# Patient Record
Sex: MALE | Race: WHITE | NOT HISPANIC OR LATINO | Employment: OTHER | URBAN - METROPOLITAN AREA
[De-identification: names, ages, dates, MRNs, and addresses within clinical notes are randomized per-mention and may not be internally consistent; named-entity substitution may affect disease eponyms.]

---

## 2018-01-01 ENCOUNTER — APPOINTMENT (OUTPATIENT)
Dept: GENERAL RADIOLOGY | Facility: CLINIC | Age: 78
End: 2018-01-01
Attending: EMERGENCY MEDICINE
Payer: MEDICARE

## 2018-01-01 ENCOUNTER — HOSPITAL ENCOUNTER (EMERGENCY)
Facility: CLINIC | Age: 78
Discharge: HOME | End: 2018-10-22
Attending: EMERGENCY MEDICINE | Admitting: EMERGENCY MEDICINE
Payer: MEDICARE

## 2018-01-01 ENCOUNTER — APPOINTMENT (OUTPATIENT)
Dept: CT IMAGING | Facility: CLINIC | Age: 78
End: 2018-01-01
Attending: EMERGENCY MEDICINE
Payer: MEDICARE

## 2018-01-01 VITALS
HEIGHT: 73 IN | OXYGEN SATURATION: 99 % | SYSTOLIC BLOOD PRESSURE: 173 MMHG | BODY MASS INDEX: 23.19 KG/M2 | HEART RATE: 56 BPM | WEIGHT: 175 LBS | RESPIRATION RATE: 16 BRPM | DIASTOLIC BLOOD PRESSURE: 79 MMHG

## 2018-01-01 DIAGNOSIS — S80.02XA CONTUSION OF LEFT KNEE, INITIAL ENCOUNTER: ICD-10-CM

## 2018-01-01 DIAGNOSIS — S70.00XA CONTUSION OF HIP, INITIAL ENCOUNTER: ICD-10-CM

## 2018-01-01 DIAGNOSIS — S00.03XA CONTUSION OF SCALP, INITIAL ENCOUNTER: ICD-10-CM

## 2018-01-01 LAB
ANION GAP SERPL CALCULATED.3IONS-SCNC: 5 MMOL/L (ref 3–14)
BASOPHILS # BLD AUTO: 0 10E9/L (ref 0–0.2)
BASOPHILS NFR BLD AUTO: 0.4 %
BUN SERPL-MCNC: 15 MG/DL (ref 7–30)
CALCIUM SERPL-MCNC: 8.5 MG/DL (ref 8.5–10.1)
CHLORIDE SERPL-SCNC: 103 MMOL/L (ref 94–109)
CO2 SERPL-SCNC: 31 MMOL/L (ref 20–32)
CREAT SERPL-MCNC: 1 MG/DL (ref 0.66–1.25)
DIFFERENTIAL METHOD BLD: ABNORMAL
EOSINOPHIL # BLD AUTO: 0.2 10E9/L (ref 0–0.7)
EOSINOPHIL NFR BLD AUTO: 4.7 %
ERYTHROCYTE [DISTWIDTH] IN BLOOD BY AUTOMATED COUNT: 15.3 % (ref 10–15)
GFR SERPL CREATININE-BSD FRML MDRD: 73 ML/MIN/1.7M2
GLUCOSE SERPL-MCNC: 193 MG/DL (ref 70–99)
HCT VFR BLD AUTO: 33.4 % (ref 40–53)
HGB BLD-MCNC: 10.8 G/DL (ref 13.3–17.7)
IMM GRANULOCYTES # BLD: 0 10E9/L (ref 0–0.4)
IMM GRANULOCYTES NFR BLD: 0.2 %
INTERPRETATION ECG - MUSE: NORMAL
LYMPHOCYTES # BLD AUTO: 1 10E9/L (ref 0.8–5.3)
LYMPHOCYTES NFR BLD AUTO: 20.9 %
MCH RBC QN AUTO: 29.5 PG (ref 26.5–33)
MCHC RBC AUTO-ENTMCNC: 32.3 G/DL (ref 31.5–36.5)
MCV RBC AUTO: 91 FL (ref 78–100)
MONOCYTES # BLD AUTO: 0.4 10E9/L (ref 0–1.3)
MONOCYTES NFR BLD AUTO: 8 %
NEUTROPHILS # BLD AUTO: 3.2 10E9/L (ref 1.6–8.3)
NEUTROPHILS NFR BLD AUTO: 65.8 %
NRBC # BLD AUTO: 0 10*3/UL
NRBC BLD AUTO-RTO: 0 /100
PLATELET # BLD AUTO: 125 10E9/L (ref 150–450)
POTASSIUM SERPL-SCNC: 4.2 MMOL/L (ref 3.4–5.3)
RBC # BLD AUTO: 3.66 10E12/L (ref 4.4–5.9)
SODIUM SERPL-SCNC: 139 MMOL/L (ref 133–144)
WBC # BLD AUTO: 4.9 10E9/L (ref 4–11)

## 2018-01-01 PROCEDURE — 70450 CT HEAD/BRAIN W/O DYE: CPT

## 2018-01-01 PROCEDURE — 85025 COMPLETE CBC W/AUTO DIFF WBC: CPT | Performed by: EMERGENCY MEDICINE

## 2018-01-01 PROCEDURE — 73562 X-RAY EXAM OF KNEE 3: CPT | Mod: LT

## 2018-01-01 PROCEDURE — 73522 X-RAY EXAM HIPS BI 3-4 VIEWS: CPT

## 2018-01-01 PROCEDURE — 99285 EMERGENCY DEPT VISIT HI MDM: CPT | Mod: 25

## 2018-01-01 PROCEDURE — 93005 ELECTROCARDIOGRAM TRACING: CPT

## 2018-01-01 PROCEDURE — 80048 BASIC METABOLIC PNL TOTAL CA: CPT | Performed by: EMERGENCY MEDICINE

## 2018-01-01 RX ORDER — CARBIDOPA AND LEVODOPA 25; 100 MG/1; MG/1
2 TABLET ORAL 3 TIMES DAILY
COMMUNITY
Start: 2015-07-06

## 2018-01-01 RX ORDER — DONEPEZIL HYDROCHLORIDE 10 MG/1
10 TABLET, FILM COATED ORAL DAILY
COMMUNITY

## 2018-01-01 RX ORDER — LIDOCAINE 40 MG/G
CREAM TOPICAL
Status: DISCONTINUED | OUTPATIENT
Start: 2018-01-01 | End: 2018-01-01 | Stop reason: HOSPADM

## 2018-01-01 RX ORDER — MODAFINIL 200 MG/1
200 TABLET ORAL EVERY MORNING
COMMUNITY
End: 2019-01-01

## 2018-01-01 ASSESSMENT — ENCOUNTER SYMPTOMS
HEADACHES: 1
LIGHT-HEADEDNESS: 1
ARTHRALGIAS: 0
DIZZINESS: 1
MYALGIAS: 0

## 2018-10-22 NOTE — ED AVS SNAPSHOT
[facility] Emergency Department    [address]    [facility] [address]    Phone:  [phone]    Fax:  [phone]                                       Josafat Matson   MRN: [id number]    Department:  [facility] Emergency Department   Date of Visit:  10/22/2018           After Visit Summary Signature Page     I have received my discharge instructions, and my questions have been answered. I have discussed any challenges I see with this plan with the nurse or doctor.    ..........................................................................................................................................  Patient/Patient Representative Signature      ..........................................................................................................................................  Patient Representative Print Name and Relationship to Patient    ..................................................               ................................................  Date                                   Time    ..........................................................................................................................................  Reviewed by Signature/Title    ...................................................              ..............................................  Date                                               Time          22EPIC Rev 08/18

## 2018-10-22 NOTE — ED PROVIDER NOTES
History     Chief Complaint:  Fall    HPI   Josafat Matson is a 78 year old male with a history of atrial fibrillation, diabetes, alzheimer's, among others, just recently off of Eliquis, who presents with his wife via EMS for evaluation after sustaining a fall earlier today. Per report, the patient had just finished a cycling routine at the [facility], when he stood suddenly felt dizzy causing him to fall to his knees. His wife states that at this time she tried to catch him, but he twisted out of her  and felt to his left side, striking his head against the wall. EMS was then called. While in the ED the patient denies any significant pain, although he does note some left knee and hip pain as he states that this knee was the first to hit the ground, as well as some persistent head pain due to a contusion. Of note, the patient just recently ended his long term use of anticoagulants due recurring anemia and dizziness of unknown etiology. He stopped taking this on the 12th of this month but continues to take a baby aspirin daily. The patient was on Eliquis for chronic atrial fibrillation, and his wife also reports a history of aortic aneurysms and stent placement. The patient otherwise did not sustain a loss of consciousness today and does not endorse any further symptoms at this week.     Allergies:  Tamsulosin  Terazosin    Medications:    Baby aspirin  Lipitor  Aricept  Metformin  Nitroglycerin  Namenda  Provigil  Mirapex  Sinemet    Past Medical History:    Atrial fibrillation  Alzheimer's disease  CAD  MI  Hypertension  Hyperlipidemia  Diabetes  Stented coronary artery  Sleep apnea    Past Surgical History:    Coronary stent placement  Colonoscopy  Cataract removal  Intraocular lens implant    Family History:    History reviewed. No pertinent family history.     Social History:  The patient was accompanied to the ED by his wife.  Smoking Status: Former  Smokeless Tobacco: Never  Alcohol Use: Yes  Marital  "Status:       PCP: Georgiana Bansal MD    Review of Systems   Musculoskeletal: Negative for arthralgias and myalgias.   Neurological: Positive for dizziness, light-headedness and headaches.   All other systems reviewed and are negative.    Physical Exam     Patient Vitals for the past 24 hrs:   BP Pulse Heart Rate Resp SpO2 Height Weight   10/22/18 1706 173/79 - - - 99 % - -   10/22/18 1600 139/69 - 56 11 100 % - -   10/22/18 1515 - - 59 13 100 % - -   10/22/18 1454 128/73 62 - 20 100 % 1.854 m (6' 1\") 79.4 kg (175 lb)     Physical Exam   GEN- alert, cooperative  HEENT- atraumatic, PERRL, EOMI, MMM, oral pharynx without abnormalities, no dental injuries, midface stable, TM's clear bilaterally  NECK- ROM, soft, supple, no midline C spine tenderness to palpation, no abrasions  RESP- CTAB, no w/r/r, chest wall nontender, no crepitus, symmetrical chest wall movement  CV- RRR, no m/r/g  ABD- soft, NT/ND, +BS  MSK- normal ROM in all extremities, pelvis stable to AP and lateral compression, no T and L spinal tenderness in the midline, 5/5 strength in all extremities. Mild B hip TTP without deformity or signs of trauma. L knee with mild TTP without signs of trauma  NEURO- GCS 15, speech normal, alert, 5/5 strength x 4, sensation to light touch intact in all extremities,  strong bilaterally  SKIN- no rash, no bruising, no ecchymosis, no abrasio  PSYCH- normal mood, normal behavior, normal thought process    Emergency Department Course     ECG (14:52:45):  Rate 61 bpm. MT interval 204. QRS duration 98. QT/QTc 416/418. P-R-T axes 58 61 56. Normal sinus rhythm. Interpreted by Mindy Monique MD.    Imaging:  Radiology findings were communicated with the patient who voiced understanding of the findings.  XR Pelvis w 1 View Each Hip:  Left knee: No acute or chronic bony abnormality. No joint space  effusion. Vascular calcifications are seen.    Pelvis and hips: No acute bony abnormality. Chondrocalcinosis " right  femoral head is noted. No significant degenerative changes of the  hips. Extensive vascular calcifications. Aortobiiliac stent.    As read by radiology    XR Knee Left 3 Views:  Left knee: No acute or chronic bony abnormality. No joint space  effusion. Vascular calcifications are seen.    Pelvis and hips: No acute bony abnormality. Chondrocalcinosis right  femoral head is noted. No significant degenerative changes of the  hips. Extensive vascular calcifications. Aortobiiliac stent.    As read by radiology    CT Head w/o Contrast:  1. No acute abnormality.  2.  Atrophy of the brain.  White matter changes consistent with  sequelae of small vessel ischemic disease.    ARNOLDO JOSEPH MD    Laboratory:  Laboratory findings were communicated with the patient who voiced understanding of the findings.    CBC: WBC 4.9, HGB 10.8 (L),  (L)  BMP: Glucose 193 (H), o/w WNL (Creatinine 1.00)    Emergency Department Course:  Nursing notes and vitals reviewed.    The patient was sent for multiple lower extremity x-rays while in the emergency department, results above.     IV was inserted and blood was drawn for laboratory testing, results above.    1459: I performed an exam of the patient as documented above.     1705: Patient rechecked and updated. Patient is feeling better and is ready to go home.    Findings and plan explained to the Patient. Patient discharged home with instructions regarding supportive care, medications, and reasons to return. The importance of close follow-up was reviewed.     Impression & Plan      Medical Decision Making:  This patient presents with fall from standing up too fast at the gym. Patient initially was oriented x 3 and normal head to toe exam showed contusion of the hip and left knee and also a small scalp contusion. There is no swelling or deformities noted, otheriwse labs, x-rays and CT were normal. He was recently taken off of Eliquis but placed on aspirin. Currently he feels  comfortable going home and has no complaints and his wife was comfortable taking him home. His dizziness has been worked up and is thought to be due to low blood pressure from standing and they are well aware that he does need to take his time to ensure that this does not happen in the future.     Diagnosis:    ICD-10-CM    1. Contusion of scalp, initial encounter S00.03XA    2. Hip contusion  3. Knee contusion  4. Fall    Disposition:  discharged to home    Jigna Clay  10/22/2018   [facility] EMERGENCY DEPARTMENT  IJigna, phyllis serving as a scribe at 2:59 PM on 10/22/2018 to document services personally performed by Mindy Monique MD based on my observations and the provider's statements to me.  D     Mindy Monique MD  10/24/18 2051

## 2018-10-22 NOTE — ED NOTES
"Pt with history of dizziness upon standing, stood up and felt dizzy. Pt started to fall. Wife tried to hold him up  \"but he went down to his knees and then tipped over on his right side and hit his head on the railing. Pt denies HA, neck or back pain. Denies knee pain or hip pain. Range of motion intact to extrem. Pt recently stopped eliquis so I thought we I should be checked.\"  "
Bed: ED20  Expected date:   Expected time:   Means of arrival:   Comments:  Ambulance  
awake/alert/oriented to person, place, time/situation

## 2018-10-22 NOTE — ED AVS SNAPSHOT
[facility] Emergency Department    [address] [place] [address]    Phone:  [phone]    Fax:  [phone]                                       Josafat Matson   MRN: [id number]    Department:  [facility] Emergency Department   Date of Visit:  10/22/2018           Patient Information     Date Of Birth          1940        Your diagnoses for this visit were:     Contusion of scalp, initial encounter     Contusion of hip, initial encounter     Contusion of left knee, initial encounter        You were seen by Mindy Monique MD.      Follow-up Information     Follow up with Georgiana Bansal.    Specialty:  Internal Medicine    Why:  As needed    Contact information:    [facility]  [address] [place] [address]  [phone]          Discharge Instructions         Bruises (Contusions)    A contusion is a bruise. A bruise happens when a blow to your body doesn't break the skin but does break blood vessels beneath the skin. Blood leaking from the broken vessels causes redness and swelling. As it heals, your bruise is likely to turn colors like purple, green, and yellow. This is normal. The bruise should fade in 2 or 3 weeks.  Factors that make you more likely to bruise  Almost everyone bruises now and then. Certain people do bruise more easily than others. You're more prone to bruising as you get older. That's because blood vessels become more fragile with age. You're also more likely to bruise if you have a clotting disorder such as hemophilia or take medicines that reduce clotting, including aspirin and coumadin.  When to go to the emergency room (ER)  Bruises almost always heal on their own without special treatment. But for some people, a bad bruise can be serious. Seek medical care if you:    Have a clotting disorder such as hemophilia    Have cirrhosis or other serious liver disease    Take blood-thinning medicines such as warfarin  What to expect  in the ER  A doctor will examine your bruise and ask about any health conditions you have. In some cases, you may have a test to check how well your blood clots. Other treatment will depend on your needs.  Follow-up care  Sometimes a bruise gets worse instead of better. It may become larger and more swollen. This can occur when your body walls off a small pool of blood under the skin (hematoma). In very rare cases, your doctor may need to drain extra blood from the area.  Tip:  Apply an ice pack or bag of frozen peas to a bruise. Keep a thin cloth between the ice or frozen peas and your skin. The cold can help reduce redness and swelling.   Date Last Reviewed: 12/1/2016 2000-2017 [employer]. [address] [place] [address], [place] [id number]. All rights reserved. This information is not intended as a substitute for professional medical care. Always follow your healthcare professional's instructions.          Hip Contusion    A contusion is another word for a bruise. It happens when small blood vessels break open and leak blood into the nearby area. A hip contusion can result from a bump, hit, or fall. Symptoms of a contusion often include changes in skin color (bruising), swelling, and pain. It may take several hours for a deep bruise to show up. If the injury is severe, you may need an X-ray to check for broken bones. Swelling should decrease in a few days. Bruising and pain may take several weeks to go away.  Home care    Unless another medicine was prescribed, you may take acetaminophen, ibuprofen, or naproxen to help relieve pain and swelling. If needed, stronger pain medicines may be prescribed. Take all medicines exactly as directed.    Ice the bruised area to help reduce pain and swelling. Wrap a cold source (ice pack or ice cubes in a plastic bag) in a thin towel. Apply the cold source to the bruised area for 20 minutes every 1 to 2 hours the first day. Continue this 3 to 4 times a day until the  pain and swelling goes away.    If walking causes pain, use crutches or a walker until you can walk without pain. These items can be rented at most pharmacies and orthopedic supply stores.    If your injury is keeping you from moving around or caring for yourself properly, you may qualify for services such as home healthcare. Check with your doctor and insurance company to see if this type of care is covered.  Follow-up  Follow up with your healthcare provider, or as advised.  When to seek medical advice   Call your healthcare provider right away if any of these occur:    Increased pain, bruising, or swelling near the injured area    Decreased ability to bear weight on the injured side    Pain or swelling develops below the knee    Chest pain or shortness of breath  Date Last Reviewed: 4/1/2017 2000-2017 [employer]. [address] [place] [address]. All rights reserved. This information is not intended as a substitute for professional medical care. Always follow your healthcare professional's instructions.          24 Hour Appointment Hotline       To make an appointment at any [facility], call [phone] ([phone]). If you don't have a family doctor or clinic, we will help you find one. [facility] clinics are conveniently located to serve the needs of you and your family.             Review of your medicines      Our records show that you are taking the medicines listed below. If these are incorrect, please call your family doctor or clinic.        Dose / Directions Last dose taken    carbidopa-levodopa  MG per tablet   Commonly known as:  SINEMET   Dose:  2 tablet        Take 2 tablets by mouth   Refills:  0        CYANOCOBALAMIN PO   Dose:  1000 mcg        Take 1,000 mcg by mouth daily   Refills:  0        donepezil 10 MG tablet   Commonly known as:  ARICEPT   Dose:  10 mg        Take 10 mg by mouth   Refills:  0        ECOTRIN PO   Dose:  325 mg        Take 325 mg by  mouth daily   Refills:  0        LIPITOR PO   Dose:  80 mg        Take 80 mg by mouth daily   Refills:  0        metFORMIN 500 MG/5ML Soln solution   Commonly known as:  GLUCOPHAGE   Dose:  500 mg        Take 500 mg by mouth 2 times daily   Refills:  0        * MIRAPEX PO   Dose:  0.5-0.75 mg        Take 0.5-0.75 mg by mouth daily 2-3 hours before bedtime   Refills:  0        * MIRAPEX PO   Dose:  0.5 mg        Take 0.5 mg by mouth daily At bedtime   Refills:  0        modafinil 200 MG tablet   Commonly known as:  PROVIGIL   Dose:  100 mg        Take 100 mg by mouth   Refills:  0        Multi-vitamin Tabs tablet   Dose:  1 tablet        Take 1 tablet by mouth daily   Refills:  0        NAMENDA PO   Dose:  10 mg        Take 10 mg by mouth daily   Refills:  0        NITROGLYCERIN SL   Dose:  0.4 mg        Place 0.4 mg under the tongue every 5 minutes as needed   Refills:  0        VITAMIN B6 PO   Dose:  100 mg        Take 100 mg by mouth daily   Refills:  0        * Notice:  This list has 2 medication(s) that are the same as other medications prescribed for you. Read the directions carefully, and ask your doctor or other care provider to review them with you.            Procedures and tests performed during your visit     Basic metabolic panel    CBC with platelets differential    CT Head w/o Contrast    EKG 12 lead    Peripheral IV catheter    XR Knee Left 3 Views    XR Pelvis w 1 View Each Hip      Orders Needing Specimen Collection     None      Pending Results     Date and Time Order Name Status Description    10/22/2018 1506 XR Pelvis w 1 View Each Hip Preliminary     10/22/2018 1506 XR Knee Left 3 Views Preliminary     10/22/2018 1456 EKG 12 lead Preliminary             Pending Culture Results     No orders found from 10/20/2018 to 10/23/2018.            Pending Results Instructions     If you had any lab results that were not finalized at the time of your Discharge, you can call the ED Lab Result RN at  [phone]. You will be contacted by this team for any positive Lab results or changes in treatment. The nurses are available 7 days a week from 10A to 6:30P.  You can leave a message 24 hours per day and they will return your call.        Test Results From Your Hospital Stay        10/22/2018  3:58 PM      Component Results     Component Value Ref Range & Units Status    WBC 4.9 4.0 - 11.0 10e9/L Final    RBC Count 3.66 (L) 4.4 - 5.9 10e12/L Final    Hemoglobin 10.8 (L) 13.3 - 17.7 g/dL Final    Hematocrit 33.4 (L) 40.0 - 53.0 % Final    MCV 91 78 - 100 fl Final    MCH 29.5 26.5 - 33.0 pg Final    MCHC 32.3 31.5 - 36.5 g/dL Final    RDW 15.3 (H) 10.0 - 15.0 % Final    Platelet Count 125 (L) 150 - 450 10e9/L Final    Diff Method Automated Method  Final    % Neutrophils 65.8 % Final    % Lymphocytes 20.9 % Final    % Monocytes 8.0 % Final    % Eosinophils 4.7 % Final    % Basophils 0.4 % Final    % Immature Granulocytes 0.2 % Final    Nucleated RBCs 0 0 /100 Final    Absolute Neutrophil 3.2 1.6 - 8.3 10e9/L Final    Absolute Lymphocytes 1.0 0.8 - 5.3 10e9/L Final    Absolute Monocytes 0.4 0.0 - 1.3 10e9/L Final    Absolute Eosinophils 0.2 0.0 - 0.7 10e9/L Final    Absolute Basophils 0.0 0.0 - 0.2 10e9/L Final    Abs Immature Granulocytes 0.0 0 - 0.4 10e9/L Final    Absolute Nucleated RBC 0.0  Final         10/22/2018  4:16 PM      Component Results     Component Value Ref Range & Units Status    Sodium 139 133 - 144 mmol/L Final    Potassium 4.2 3.4 - 5.3 mmol/L Final    Chloride 103 94 - 109 mmol/L Final    Carbon Dioxide 31 20 - 32 mmol/L Final    Anion Gap 5 3 - 14 mmol/L Final    Glucose 193 (H) 70 - 99 mg/dL Final    Urea Nitrogen 15 7 - 30 mg/dL Final    Creatinine 1.00 0.66 - 1.25 mg/dL Final    GFR Estimate 73 >60 mL/min/1.7m2 Final    Non  GFR Calc    GFR Estimate If Black 88 >60 mL/min/1.7m2 Final    African American GFR Calc    Calcium 8.5 8.5 - 10.1 mg/dL Final         10/22/2018  4:38  PM      Narrative     CT SCAN OF THE HEAD WITHOUT CONTRAST   10/22/2018 4:30 PM     HISTORY: Fall.    TECHNIQUE:  Axial images of the head and coronal reformations without  IV contrast material. Radiation dose for this scan was reduced using  automated exposure control, adjustment of the mA and/or kV according  to patient size, or iterative reconstruction technique.    COMPARISON: None.    FINDINGS:  There is generalized atrophy of the brain.  There is low  attenuation in the white matter of the cerebral hemispheres consistent  with sequelae of small vessel ischemic disease. There is no evidence  of intracranial hemorrhage, mass, acute infarct or anomaly.     The visualized portions of the sinuses and mastoids appear normal.  There is no evidence of trauma.        Impression     IMPRESSION:   1. No acute abnormality.  2.  Atrophy of the brain.  White matter changes consistent with  sequelae of small vessel ischemic disease.      ARNOLDO JOSEPH MD         10/22/2018  4:55 PM      Narrative     XR LEFT KNEE THREE VIEWS, XR PELVIS AND BILATERAL HIP ONE VIEW  10/22/2018 4:48 PM     HISTORY: Fall.    COMPARISON: None.        Impression     IMPRESSION:     Left knee: No acute or chronic bony abnormality. No joint space  effusion. Vascular calcifications are seen.    Pelvis and hips: No acute bony abnormality. Chondrocalcinosis right  femoral head is noted. No significant degenerative changes of the  hips. Extensive vascular calcifications. Aortobiiliac stent.         10/22/2018  4:55 PM      Narrative     XR LEFT KNEE THREE VIEWS, XR PELVIS AND BILATERAL HIP ONE VIEW  10/22/2018 4:48 PM     HISTORY: Fall.    COMPARISON: None.        Impression     IMPRESSION:     Left knee: No acute or chronic bony abnormality. No joint space  effusion. Vascular calcifications are seen.    Pelvis and hips: No acute bony abnormality. Chondrocalcinosis right  femoral head is noted. No significant degenerative changes of the  hips. Extensive  vascular calcifications. Aortobiiliac stent.                Clinical Quality Measure: Blood Pressure Screening     Your blood pressure was checked while you were in the emergency department today. The last reading we obtained was  BP: 139/69 . Please read the guidelines below about what these numbers mean and what you should do about them.  If your systolic blood pressure (the top number) is less than 120 and your diastolic blood pressure (the bottom number) is less than 80, then your blood pressure is normal. There is nothing more that you need to do about it.  If your systolic blood pressure (the top number) is 120-139 or your diastolic blood pressure (the bottom number) is 80-89, your blood pressure may be higher than it should be. You should have your blood pressure rechecked within a year by a primary care provider.  If your systolic blood pressure (the top number) is 140 or greater or your diastolic blood pressure (the bottom number) is 90 or greater, you may have high blood pressure. High blood pressure is treatable, but if left untreated over time it can put you at risk for heart attack, stroke, or kidney failure. You should have your blood pressure rechecked by a primary care provider within the next 4 weeks.  If your provider in the emergency department today gave you specific instructions to follow-up with your doctor or provider even sooner than that, you should follow that instruction and not wait for up to 4 weeks for your follow-up visit.        Thank you for choosing [facility]       Thank you for choosing [facility] for your care. Our goal is always to provide you with excellent care. Hearing back from our patients is one way we can continue to improve our services. Please take a few minutes to complete the written survey that you may receive in the mail after you visit with us. Thank you!        [facility] Information     [facility] lets you send messages to your doctor, view your test results, renew your  "prescriptions, schedule appointments and more. To sign up, go to [email or web address] . Click on \"Log in\" on the left side of the screen, which will take you to the Welcome page. Then click on \"Sign up Now\" on the right side of the page.     You will be asked to enter the access code listed below, as well as some personal information. Please follow the directions to create your username and password.     Your access code is: [email or web address]  Expires: 2019  5:14 PM     Your access code will  in 90 days. If you need help or a new code, please call your [facility] clinic or [phone].        Care EveryWhere ID     This is your Care EveryWhere ID. This could be used by other organizations to access your [facility] medical records  [id number]        Equal Access to Services     JAEL HARRIS : Chauncey Llanes, rafael quinones, mali junior, brennan torres . So [facility] [phone].    ATENCIÓN: Si habla español, tiene a norton disposición servicios gratuitos de asistencia lingüística. Llame al [phone].    We comply with applicable federal civil rights laws and [place] laws. We do not discriminate on the basis of race, color, national origin, age, disability, sex, sexual orientation, or gender identity.            After Visit Summary       This is your record. Keep this with you and show to your community pharmacist(s) and doctor(s) at your next visit.                  "

## 2018-10-22 NOTE — DISCHARGE INSTRUCTIONS
Bruises (Contusions)    A contusion is a bruise. A bruise happens when a blow to your body doesn't break the skin but does break blood vessels beneath the skin. Blood leaking from the broken vessels causes redness and swelling. As it heals, your bruise is likely to turn colors like purple, green, and yellow. This is normal. The bruise should fade in 2 or 3 weeks.  Factors that make you more likely to bruise  Almost everyone bruises now and then. Certain people do bruise more easily than others. You're more prone to bruising as you get older. That's because blood vessels become more fragile with age. You're also more likely to bruise if you have a clotting disorder such as hemophilia or take medicines that reduce clotting, including aspirin and coumadin.  When to go to the emergency room (ER)  Bruises almost always heal on their own without special treatment. But for some people, a bad bruise can be serious. Seek medical care if you:    Have a clotting disorder such as hemophilia    Have cirrhosis or other serious liver disease    Take blood-thinning medicines such as warfarin  What to expect in the ER  A doctor will examine your bruise and ask about any health conditions you have. In some cases, you may have a test to check how well your blood clots. Other treatment will depend on your needs.  Follow-up care  Sometimes a bruise gets worse instead of better. It may become larger and more swollen. This can occur when your body walls off a small pool of blood under the skin (hematoma). In very rare cases, your doctor may need to drain extra blood from the area.  Tip:  Apply an ice pack or bag of frozen peas to a bruise. Keep a thin cloth between the ice or frozen peas and your skin. The cold can help reduce redness and swelling.   Date Last Reviewed: 12/1/2016 2000-2017 [employer] [address] [facility], [place] [address]. All rights reserved. This information is not intended as a substitute for  professional medical care. Always follow your healthcare professional's instructions.          Hip Contusion    A contusion is another word for a bruise. It happens when small blood vessels break open and leak blood into the nearby area. A hip contusion can result from a bump, hit, or fall. Symptoms of a contusion often include changes in skin color (bruising), swelling, and pain. It may take several hours for a deep bruise to show up. If the injury is severe, you may need an X-ray to check for broken bones. Swelling should decrease in a few days. Bruising and pain may take several weeks to go away.  Home care    Unless another medicine was prescribed, you may take acetaminophen, ibuprofen, or naproxen to help relieve pain and swelling. If needed, stronger pain medicines may be prescribed. Take all medicines exactly as directed.    Ice the bruised area to help reduce pain and swelling. Wrap a cold source (ice pack or ice cubes in a plastic bag) in a thin towel. Apply the cold source to the bruised area for 20 minutes every 1 to 2 hours the first day. Continue this 3 to 4 times a day until the pain and swelling goes away.    If walking causes pain, use crutches or a walker until you can walk without pain. These items can be rented at most pharmacies and orthopedic supply stores.    If your injury is keeping you from moving around or caring for yourself properly, you may qualify for services such as home healthcare. Check with your doctor and insurance company to see if this type of care is covered.  Follow-up  Follow up with your healthcare provider, or as advised.  When to seek medical advice   Call your healthcare provider right away if any of these occur:    Increased pain, bruising, or swelling near the injured area    Decreased ability to bear weight on the injured side    Pain or swelling develops below the knee    Chest pain or shortness of breath  Date Last Reviewed: 4/1/2017 2000-2017 [facility]  [employer] [address] [place] [address], [place] [address]. All rights reserved. This information is not intended as a substitute for professional medical care. Always follow your healthcare professional's instructions.

## 2019-01-01 ENCOUNTER — HOME CARE/HOSPICE - HEALTHEAST (OUTPATIENT)
Dept: HOSPICE | Facility: HOSPICE | Age: 79
End: 2019-01-01

## 2019-01-01 ENCOUNTER — DOCUMENTATION ONLY (OUTPATIENT)
Dept: OTHER | Facility: CLINIC | Age: 79
End: 2019-01-01

## 2019-01-01 ENCOUNTER — AMBULATORY - HEALTHEAST (OUTPATIENT)
Dept: OTHER | Facility: CLINIC | Age: 79
End: 2019-01-01

## 2019-01-01 ENCOUNTER — AMBULATORY - HEALTHEAST (OUTPATIENT)
Dept: HOSPICE | Facility: HOSPICE | Age: 79
End: 2019-01-01

## 2019-01-01 ENCOUNTER — APPOINTMENT (OUTPATIENT)
Dept: CT IMAGING | Facility: CLINIC | Age: 79
DRG: 603 | End: 2019-01-01
Attending: EMERGENCY MEDICINE
Payer: MEDICARE

## 2019-01-01 ENCOUNTER — APPOINTMENT (OUTPATIENT)
Dept: CT IMAGING | Facility: CLINIC | Age: 79
End: 2019-01-01
Payer: MEDICARE

## 2019-01-01 ENCOUNTER — RECORDS - HEALTHEAST (OUTPATIENT)
Dept: LAB | Facility: CLINIC | Age: 79
End: 2019-01-01

## 2019-01-01 ENCOUNTER — RECORDS - HEALTHEAST (OUTPATIENT)
Dept: ADMINISTRATIVE | Facility: OTHER | Age: 79
End: 2019-01-01

## 2019-01-01 ENCOUNTER — APPOINTMENT (OUTPATIENT)
Dept: GENERAL RADIOLOGY | Facility: CLINIC | Age: 79
DRG: 603 | End: 2019-01-01
Attending: EMERGENCY MEDICINE
Payer: MEDICARE

## 2019-01-01 ENCOUNTER — APPOINTMENT (OUTPATIENT)
Dept: GENERAL RADIOLOGY | Facility: CLINIC | Age: 79
End: 2019-01-01
Attending: EMERGENCY MEDICINE
Payer: MEDICARE

## 2019-01-01 ENCOUNTER — APPOINTMENT (OUTPATIENT)
Dept: SPEECH THERAPY | Facility: CLINIC | Age: 79
DRG: 603 | End: 2019-01-01
Attending: INTERNAL MEDICINE
Payer: MEDICARE

## 2019-01-01 ENCOUNTER — APPOINTMENT (OUTPATIENT)
Dept: ULTRASOUND IMAGING | Facility: CLINIC | Age: 79
DRG: 603 | End: 2019-01-01
Attending: INTERNAL MEDICINE
Payer: MEDICARE

## 2019-01-01 ENCOUNTER — APPOINTMENT (OUTPATIENT)
Dept: MRI IMAGING | Facility: CLINIC | Age: 79
End: 2019-01-01
Payer: MEDICARE

## 2019-01-01 ENCOUNTER — APPOINTMENT (OUTPATIENT)
Dept: SPEECH THERAPY | Facility: CLINIC | Age: 79
DRG: 603 | End: 2019-01-01
Payer: MEDICARE

## 2019-01-01 ENCOUNTER — APPOINTMENT (OUTPATIENT)
Dept: ULTRASOUND IMAGING | Facility: CLINIC | Age: 79
End: 2019-01-01
Attending: EMERGENCY MEDICINE
Payer: MEDICARE

## 2019-01-01 ENCOUNTER — APPOINTMENT (OUTPATIENT)
Dept: PHYSICAL THERAPY | Facility: CLINIC | Age: 79
End: 2019-01-01
Payer: MEDICARE

## 2019-01-01 ENCOUNTER — HOSPITAL ENCOUNTER (OUTPATIENT)
Facility: CLINIC | Age: 79
Setting detail: OBSERVATION
Discharge: HOME | End: 2019-01-31
Attending: EMERGENCY MEDICINE | Admitting: HOSPITALIST
Payer: MEDICARE

## 2019-01-01 ENCOUNTER — HOSPITAL ENCOUNTER (EMERGENCY)
Facility: CLINIC | Age: 79
Discharge: HOME | End: 2019-02-11
Attending: EMERGENCY MEDICINE | Admitting: EMERGENCY MEDICINE
Payer: MEDICARE

## 2019-01-01 ENCOUNTER — HOSPITAL ENCOUNTER (INPATIENT)
Facility: CLINIC | Age: 79
LOS: 4 days | Discharge: NURSING, REHAB OR LONG-TERM CARE | DRG: 603 | End: 2019-03-14
Attending: EMERGENCY MEDICINE | Admitting: INTERNAL MEDICINE
Payer: MEDICARE

## 2019-01-01 VITALS
SYSTOLIC BLOOD PRESSURE: 128 MMHG | DIASTOLIC BLOOD PRESSURE: 75 MMHG | RESPIRATION RATE: 14 BRPM | OXYGEN SATURATION: 98 % | TEMPERATURE: 97.8 F | HEART RATE: 81 BPM

## 2019-01-01 VITALS
DIASTOLIC BLOOD PRESSURE: 62 MMHG | SYSTOLIC BLOOD PRESSURE: 138 MMHG | OXYGEN SATURATION: 97 % | HEIGHT: 73 IN | WEIGHT: 175 LBS | BODY MASS INDEX: 23.19 KG/M2 | RESPIRATION RATE: 16 BRPM | HEART RATE: 67 BPM | TEMPERATURE: 95.4 F

## 2019-01-01 VITALS
RESPIRATION RATE: 18 BRPM | HEIGHT: 73 IN | BODY MASS INDEX: 22.33 KG/M2 | HEART RATE: 108 BPM | TEMPERATURE: 96.2 F | DIASTOLIC BLOOD PRESSURE: 64 MMHG | OXYGEN SATURATION: 98 % | SYSTOLIC BLOOD PRESSURE: 127 MMHG | WEIGHT: 168.5 LBS

## 2019-01-01 DIAGNOSIS — L03.119 CELLULITIS OF HAND: ICD-10-CM

## 2019-01-01 DIAGNOSIS — M62.81 GENERALIZED MUSCLE WEAKNESS: ICD-10-CM

## 2019-01-01 DIAGNOSIS — K59.00 CONSTIPATION, UNSPECIFIED CONSTIPATION TYPE: ICD-10-CM

## 2019-01-01 DIAGNOSIS — L03.113 CELLULITIS OF RIGHT HAND: ICD-10-CM

## 2019-01-01 DIAGNOSIS — R41.82 ALTERED MENTAL STATUS, UNSPECIFIED ALTERED MENTAL STATUS TYPE: ICD-10-CM

## 2019-01-01 DIAGNOSIS — G20.A1 DEMENTIA DUE TO PARKINSON'S DISEASE WITH BEHAVIORAL DISTURBANCE (H): ICD-10-CM

## 2019-01-01 DIAGNOSIS — G20.A1 DEMENTIA DUE TO PARKINSON'S DISEASE WITHOUT BEHAVIORAL DISTURBANCE (H): ICD-10-CM

## 2019-01-01 DIAGNOSIS — K29.71 GASTRITIS WITH HEMORRHAGE, UNSPECIFIED CHRONICITY, UNSPECIFIED GASTRITIS TYPE: ICD-10-CM

## 2019-01-01 DIAGNOSIS — D64.9 ANEMIA, UNSPECIFIED TYPE: ICD-10-CM

## 2019-01-01 DIAGNOSIS — F02.80 DEMENTIA DUE TO PARKINSON'S DISEASE WITHOUT BEHAVIORAL DISTURBANCE (H): ICD-10-CM

## 2019-01-01 DIAGNOSIS — R45.1 AGITATION: Primary | ICD-10-CM

## 2019-01-01 DIAGNOSIS — F02.818 DEMENTIA DUE TO PARKINSON'S DISEASE WITH BEHAVIORAL DISTURBANCE (H): ICD-10-CM

## 2019-01-01 LAB
ABO + RH BLD: NORMAL
ABO + RH BLD: NORMAL
ALBUMIN SERPL-MCNC: 2.4 G/DL (ref 3.4–5)
ALBUMIN SERPL-MCNC: 2.8 G/DL (ref 3.5–5)
ALBUMIN UR-MCNC: NEGATIVE MG/DL
ALBUMIN UR-MCNC: NEGATIVE MG/DL
ALP SERPL-CCNC: 87 U/L (ref 40–150)
ALP SERPL-CCNC: 89 U/L (ref 45–120)
ALT SERPL W P-5'-P-CCNC: 15 U/L (ref 0–45)
ALT SERPL W P-5'-P-CCNC: 9 U/L (ref 0–70)
ANION GAP SERPL CALCULATED.3IONS-SCNC: 3 MMOL/L (ref 3–14)
ANION GAP SERPL CALCULATED.3IONS-SCNC: 3 MMOL/L (ref 3–14)
ANION GAP SERPL CALCULATED.3IONS-SCNC: 4 MMOL/L (ref 3–14)
ANION GAP SERPL CALCULATED.3IONS-SCNC: 5 MMOL/L (ref 3–14)
ANION GAP SERPL CALCULATED.3IONS-SCNC: 6 MMOL/L (ref 3–14)
ANION GAP SERPL CALCULATED.3IONS-SCNC: 6 MMOL/L (ref 5–18)
ANION GAP SERPL CALCULATED.3IONS-SCNC: 7 MMOL/L (ref 3–14)
ANION GAP SERPL CALCULATED.3IONS-SCNC: 8 MMOL/L (ref 3–14)
APPEARANCE UR: CLEAR
APPEARANCE UR: CLEAR
AST SERPL W P-5'-P-CCNC: 34 U/L (ref 0–45)
AST SERPL W P-5'-P-CCNC: 47 U/L (ref 0–40)
BACTERIA SPEC CULT: NO GROWTH
BACTERIA SPEC CULT: NO GROWTH
BASOPHILS # BLD AUTO: 0 10E9/L (ref 0–0.2)
BASOPHILS NFR BLD AUTO: 0.2 %
BASOPHILS NFR BLD AUTO: 0.3 %
BASOPHILS NFR BLD AUTO: 0.4 %
BASOPHILS NFR BLD AUTO: 0.5 %
BASOPHILS NFR BLD AUTO: 0.6 %
BILIRUB DIRECT SERPL-MCNC: 0.3 MG/DL
BILIRUB SERPL-MCNC: 0.9 MG/DL (ref 0–1)
BILIRUB SERPL-MCNC: 1.4 MG/DL (ref 0.2–1.3)
BILIRUB UR QL STRIP: NEGATIVE
BILIRUB UR QL STRIP: NEGATIVE
BLD GP AB SCN SERPL QL: NORMAL
BLOOD BANK CMNT PATIENT-IMP: NORMAL
BUN SERPL-MCNC: 12 MG/DL (ref 7–30)
BUN SERPL-MCNC: 13 MG/DL (ref 8–28)
BUN SERPL-MCNC: 14 MG/DL (ref 7–30)
BUN SERPL-MCNC: 14 MG/DL (ref 7–30)
BUN SERPL-MCNC: 16 MG/DL (ref 7–30)
BUN SERPL-MCNC: 17 MG/DL (ref 7–30)
BUN SERPL-MCNC: 21 MG/DL (ref 7–30)
BUN SERPL-MCNC: 9 MG/DL (ref 7–30)
CALCIUM SERPL-MCNC: 7.7 MG/DL (ref 8.5–10.1)
CALCIUM SERPL-MCNC: 7.8 MG/DL (ref 8.5–10.1)
CALCIUM SERPL-MCNC: 7.8 MG/DL (ref 8.5–10.1)
CALCIUM SERPL-MCNC: 8 MG/DL (ref 8.5–10.1)
CALCIUM SERPL-MCNC: 8 MG/DL (ref 8.5–10.1)
CALCIUM SERPL-MCNC: 8.8 MG/DL (ref 8.5–10.1)
CALCIUM SERPL-MCNC: 9.1 MG/DL (ref 8.5–10.5)
CALCIUM SERPL-MCNC: 9.2 MG/DL (ref 8.5–10.1)
CHLORIDE BLD-SCNC: 102 MMOL/L (ref 98–107)
CHLORIDE SERPL-SCNC: 101 MMOL/L (ref 94–109)
CHLORIDE SERPL-SCNC: 104 MMOL/L (ref 94–109)
CHLORIDE SERPL-SCNC: 107 MMOL/L (ref 94–109)
CHLORIDE SERPL-SCNC: 109 MMOL/L (ref 94–109)
CHLORIDE SERPL-SCNC: 111 MMOL/L (ref 94–109)
CHLORIDE SERPL-SCNC: 111 MMOL/L (ref 94–109)
CHLORIDE SERPL-SCNC: 112 MMOL/L (ref 94–109)
CO2 BLDCOV-SCNC: 28 MMOL/L (ref 21–28)
CO2 SERPL-SCNC: 26 MMOL/L (ref 20–32)
CO2 SERPL-SCNC: 27 MMOL/L (ref 20–32)
CO2 SERPL-SCNC: 27 MMOL/L (ref 20–32)
CO2 SERPL-SCNC: 28 MMOL/L (ref 20–32)
CO2 SERPL-SCNC: 28 MMOL/L (ref 20–32)
CO2 SERPL-SCNC: 30 MMOL/L (ref 20–32)
CO2 SERPL-SCNC: 30 MMOL/L (ref 22–31)
CO2 SERPL-SCNC: 31 MMOL/L (ref 20–32)
COLOR UR AUTO: YELLOW
COLOR UR AUTO: YELLOW
CREAT SERPL-MCNC: 0.82 MG/DL (ref 0.66–1.25)
CREAT SERPL-MCNC: 0.84 MG/DL (ref 0.7–1.3)
CREAT SERPL-MCNC: 0.91 MG/DL (ref 0.66–1.25)
CREAT SERPL-MCNC: 0.92 MG/DL (ref 0.66–1.25)
CREAT SERPL-MCNC: 0.96 MG/DL (ref 0.66–1.25)
CREAT SERPL-MCNC: 0.97 MG/DL (ref 0.66–1.25)
CREAT SERPL-MCNC: 1.02 MG/DL (ref 0.66–1.25)
CRP SERPL-MCNC: 36.5 MG/L (ref 0–8)
DIFFERENTIAL METHOD BLD: ABNORMAL
DIFFERENTIAL METHOD BLD: NORMAL
EOSINOPHIL # BLD AUTO: 0 10E9/L (ref 0–0.7)
EOSINOPHIL # BLD AUTO: 0.1 10E9/L (ref 0–0.7)
EOSINOPHIL NFR BLD AUTO: 0.6 %
EOSINOPHIL NFR BLD AUTO: 1.3 %
EOSINOPHIL NFR BLD AUTO: 1.4 %
EOSINOPHIL NFR BLD AUTO: 2.3 %
EOSINOPHIL NFR BLD AUTO: 2.4 %
ERYTHROCYTE [DISTWIDTH] IN BLOOD BY AUTOMATED COUNT: 13.2 % (ref 10–15)
ERYTHROCYTE [DISTWIDTH] IN BLOOD BY AUTOMATED COUNT: 13.2 % (ref 10–15)
ERYTHROCYTE [DISTWIDTH] IN BLOOD BY AUTOMATED COUNT: 13.3 % (ref 10–15)
ERYTHROCYTE [DISTWIDTH] IN BLOOD BY AUTOMATED COUNT: 13.4 % (ref 10–15)
ERYTHROCYTE [DISTWIDTH] IN BLOOD BY AUTOMATED COUNT: 13.5 % (ref 10–15)
ERYTHROCYTE [DISTWIDTH] IN BLOOD BY AUTOMATED COUNT: 13.5 % (ref 10–15)
ERYTHROCYTE [DISTWIDTH] IN BLOOD BY AUTOMATED COUNT: 13.8 % (ref 11–14.5)
ERYTHROCYTE [SEDIMENTATION RATE] IN BLOOD BY WESTERGREN METHOD: 33 MM/H (ref 0–20)
FERRITIN SERPL-MCNC: 160 NG/ML (ref 27–300)
FLUAV+FLUBV AG SPEC QL: NEGATIVE
FLUAV+FLUBV AG SPEC QL: NEGATIVE
FOLATE SERPL-MCNC: 5.7 NG/ML
GFR SERPL CREATININE-BSD FRML MDRD: 70 ML/MIN/{1.73_M2}
GFR SERPL CREATININE-BSD FRML MDRD: 75 ML/MIN/{1.73_M2}
GFR SERPL CREATININE-BSD FRML MDRD: 75 ML/MIN/{1.73_M2}
GFR SERPL CREATININE-BSD FRML MDRD: 76 ML/MIN/{1.73_M2}
GFR SERPL CREATININE-BSD FRML MDRD: 76 ML/MIN/{1.73_M2}
GFR SERPL CREATININE-BSD FRML MDRD: 80 ML/MIN/{1.73_M2}
GFR SERPL CREATININE-BSD FRML MDRD: 80 ML/MIN/{1.73_M2}
GFR SERPL CREATININE-BSD FRML MDRD: 85 ML/MIN/{1.73_M2}
GFR SERPL CREATININE-BSD FRML MDRD: >60 ML/MIN/1.73M2
GLUCOSE BLD-MCNC: 170 MG/DL (ref 70–125)
GLUCOSE BLDC GLUCOMTR-MCNC: 112 MG/DL (ref 70–99)
GLUCOSE BLDC GLUCOMTR-MCNC: 128 MG/DL (ref 70–99)
GLUCOSE BLDC GLUCOMTR-MCNC: 139 MG/DL (ref 70–99)
GLUCOSE BLDC GLUCOMTR-MCNC: 144 MG/DL (ref 70–99)
GLUCOSE BLDC GLUCOMTR-MCNC: 149 MG/DL (ref 70–99)
GLUCOSE BLDC GLUCOMTR-MCNC: 150 MG/DL (ref 70–99)
GLUCOSE BLDC GLUCOMTR-MCNC: 151 MG/DL (ref 70–99)
GLUCOSE BLDC GLUCOMTR-MCNC: 152 MG/DL (ref 70–99)
GLUCOSE BLDC GLUCOMTR-MCNC: 153 MG/DL (ref 70–99)
GLUCOSE BLDC GLUCOMTR-MCNC: 154 MG/DL (ref 70–99)
GLUCOSE BLDC GLUCOMTR-MCNC: 154 MG/DL (ref 70–99)
GLUCOSE BLDC GLUCOMTR-MCNC: 156 MG/DL (ref 70–99)
GLUCOSE BLDC GLUCOMTR-MCNC: 161 MG/DL (ref 70–99)
GLUCOSE BLDC GLUCOMTR-MCNC: 165 MG/DL (ref 70–99)
GLUCOSE BLDC GLUCOMTR-MCNC: 168 MG/DL (ref 70–99)
GLUCOSE BLDC GLUCOMTR-MCNC: 169 MG/DL (ref 70–99)
GLUCOSE BLDC GLUCOMTR-MCNC: 170 MG/DL (ref 70–99)
GLUCOSE BLDC GLUCOMTR-MCNC: 171 MG/DL (ref 70–99)
GLUCOSE BLDC GLUCOMTR-MCNC: 181 MG/DL (ref 70–99)
GLUCOSE BLDC GLUCOMTR-MCNC: 192 MG/DL (ref 70–99)
GLUCOSE BLDC GLUCOMTR-MCNC: 194 MG/DL (ref 70–99)
GLUCOSE BLDC GLUCOMTR-MCNC: 194 MG/DL (ref 70–99)
GLUCOSE BLDC GLUCOMTR-MCNC: 203 MG/DL (ref 70–99)
GLUCOSE BLDC GLUCOMTR-MCNC: 204 MG/DL (ref 70–99)
GLUCOSE BLDC GLUCOMTR-MCNC: 206 MG/DL (ref 70–99)
GLUCOSE BLDC GLUCOMTR-MCNC: 210 MG/DL (ref 70–99)
GLUCOSE BLDC GLUCOMTR-MCNC: 210 MG/DL (ref 70–99)
GLUCOSE BLDC GLUCOMTR-MCNC: 229 MG/DL (ref 70–99)
GLUCOSE BLDC GLUCOMTR-MCNC: 231 MG/DL (ref 70–99)
GLUCOSE BLDC GLUCOMTR-MCNC: 237 MG/DL (ref 70–99)
GLUCOSE BLDC GLUCOMTR-MCNC: 250 MG/DL (ref 70–99)
GLUCOSE BLDC GLUCOMTR-MCNC: 250 MG/DL (ref 70–99)
GLUCOSE BLDC GLUCOMTR-MCNC: 259 MG/DL (ref 70–99)
GLUCOSE SERPL-MCNC: 113 MG/DL (ref 70–99)
GLUCOSE SERPL-MCNC: 139 MG/DL (ref 70–99)
GLUCOSE SERPL-MCNC: 148 MG/DL (ref 70–99)
GLUCOSE SERPL-MCNC: 172 MG/DL (ref 70–99)
GLUCOSE SERPL-MCNC: 179 MG/DL (ref 70–99)
GLUCOSE SERPL-MCNC: 179 MG/DL (ref 70–99)
GLUCOSE SERPL-MCNC: 188 MG/DL (ref 70–99)
GLUCOSE UR STRIP-MCNC: NEGATIVE MG/DL
GLUCOSE UR STRIP-MCNC: NEGATIVE MG/DL
HBA1C MFR BLD: 6.9 % (ref 0–5.6)
HBA1C MFR BLD: 7.2 % (ref 0–5.6)
HBA1C MFR BLD: 7.2 % (ref 4.2–6.1)
HCT VFR BLD AUTO: 27.5 % (ref 40–53)
HCT VFR BLD AUTO: 30.6 % (ref 40–53)
HCT VFR BLD AUTO: 31.8 % (ref 40–53)
HCT VFR BLD AUTO: 32.7 % (ref 40–54)
HCT VFR BLD AUTO: 34.4 % (ref 40–53)
HCT VFR BLD AUTO: 38.4 % (ref 40–53)
HCT VFR BLD AUTO: 40.7 % (ref 40–53)
HGB BLD-MCNC: 10.1 G/DL (ref 13.3–17.7)
HGB BLD-MCNC: 10.4 G/DL (ref 13.3–17.7)
HGB BLD-MCNC: 10.9 G/DL (ref 14–18)
HGB BLD-MCNC: 11.4 G/DL (ref 13.3–17.7)
HGB BLD-MCNC: 11.7 G/DL (ref 14–18)
HGB BLD-MCNC: 12.8 G/DL (ref 13.3–17.7)
HGB BLD-MCNC: 13.5 G/DL (ref 13.3–17.7)
HGB BLD-MCNC: 8.9 G/DL (ref 13.3–17.7)
HGB UR QL STRIP: NEGATIVE
HGB UR QL STRIP: NEGATIVE
IMM GRANULOCYTES # BLD: 0 10E9/L (ref 0–0.4)
IMM GRANULOCYTES NFR BLD: 0.3 %
IMM GRANULOCYTES NFR BLD: 0.4 %
IMM GRANULOCYTES NFR BLD: 0.6 %
INTERPRETATION ECG - MUSE: NORMAL
INTERPRETATION ECG - MUSE: NORMAL
IRON SATN MFR SERPL: 25 % (ref 20–50)
IRON SERPL-MCNC: 48 UG/DL (ref 42–175)
KETONES UR STRIP-MCNC: 5 MG/DL
KETONES UR STRIP-MCNC: 5 MG/DL
LACTATE BLD-SCNC: 1.5 MMOL/L (ref 0.7–2.1)
LEUKOCYTE ESTERASE UR QL STRIP: NEGATIVE
LEUKOCYTE ESTERASE UR QL STRIP: NEGATIVE
LIPASE SERPL-CCNC: 409 U/L (ref 73–393)
LYMPHOCYTES # BLD AUTO: 0.8 10E9/L (ref 0.8–5.3)
LYMPHOCYTES # BLD AUTO: 0.9 10E9/L (ref 0.8–5.3)
LYMPHOCYTES # BLD AUTO: 1.4 10E9/L (ref 0.8–5.3)
LYMPHOCYTES NFR BLD AUTO: 17.8 %
LYMPHOCYTES NFR BLD AUTO: 18 %
LYMPHOCYTES NFR BLD AUTO: 20.7 %
LYMPHOCYTES NFR BLD AUTO: 24.6 %
LYMPHOCYTES NFR BLD AUTO: 25.9 %
Lab: NORMAL
Lab: NORMAL
MCH RBC QN AUTO: 29.5 PG (ref 26.5–33)
MCH RBC QN AUTO: 29.7 PG (ref 26.5–33)
MCH RBC QN AUTO: 30 PG (ref 27–34)
MCH RBC QN AUTO: 30.1 PG (ref 26.5–33)
MCH RBC QN AUTO: 30.2 PG (ref 26.5–33)
MCH RBC QN AUTO: 30.4 PG (ref 26.5–33)
MCH RBC QN AUTO: 30.5 PG (ref 26.5–33)
MCHC RBC AUTO-ENTMCNC: 32.4 G/DL (ref 31.5–36.5)
MCHC RBC AUTO-ENTMCNC: 32.7 G/DL (ref 31.5–36.5)
MCHC RBC AUTO-ENTMCNC: 33 G/DL (ref 31.5–36.5)
MCHC RBC AUTO-ENTMCNC: 33.1 G/DL (ref 31.5–36.5)
MCHC RBC AUTO-ENTMCNC: 33.2 G/DL (ref 31.5–36.5)
MCHC RBC AUTO-ENTMCNC: 33.3 G/DL (ref 31.5–36.5)
MCHC RBC AUTO-ENTMCNC: 33.3 G/DL (ref 32–36)
MCV RBC AUTO: 89 FL (ref 78–100)
MCV RBC AUTO: 90 FL (ref 80–100)
MCV RBC AUTO: 91 FL (ref 78–100)
MCV RBC AUTO: 92 FL (ref 78–100)
MCV RBC AUTO: 93 FL (ref 78–100)
MONOCYTES # BLD AUTO: 0.2 10E9/L (ref 0–1.3)
MONOCYTES # BLD AUTO: 0.3 10E9/L (ref 0–1.3)
MONOCYTES # BLD AUTO: 0.4 10E9/L (ref 0–1.3)
MONOCYTES NFR BLD AUTO: 3.5 %
MONOCYTES NFR BLD AUTO: 5 %
MONOCYTES NFR BLD AUTO: 5.8 %
MONOCYTES NFR BLD AUTO: 5.9 %
MONOCYTES NFR BLD AUTO: 6.7 %
MUCOUS THREADS #/AREA URNS LPF: PRESENT /LPF
MUCOUS THREADS #/AREA URNS LPF: PRESENT /LPF
NEUTROPHILS # BLD AUTO: 2.3 10E9/L (ref 1.6–8.3)
NEUTROPHILS # BLD AUTO: 2.5 10E9/L (ref 1.6–8.3)
NEUTROPHILS # BLD AUTO: 3.5 10E9/L (ref 1.6–8.3)
NEUTROPHILS # BLD AUTO: 3.7 10E9/L (ref 1.6–8.3)
NEUTROPHILS # BLD AUTO: 4.6 10E9/L (ref 1.6–8.3)
NEUTROPHILS NFR BLD AUTO: 65.1 %
NEUTROPHILS NFR BLD AUTO: 67.2 %
NEUTROPHILS NFR BLD AUTO: 70.6 %
NEUTROPHILS NFR BLD AUTO: 74.7 %
NEUTROPHILS NFR BLD AUTO: 76.6 %
NITRATE UR QL: NEGATIVE
NITRATE UR QL: NEGATIVE
NRBC # BLD AUTO: 0 10*3/UL
NRBC BLD AUTO-RTO: 0 /100
PCO2 BLDV: 47 MM HG (ref 40–50)
PH BLDV: 7.38 PH (ref 7.32–7.43)
PH UR STRIP: 5 PH (ref 5–7)
PH UR STRIP: 7 PH (ref 5–7)
PLATELET # BLD AUTO: 117 10E9/L (ref 150–450)
PLATELET # BLD AUTO: 118 10E9/L (ref 150–450)
PLATELET # BLD AUTO: 123 10E9/L (ref 150–450)
PLATELET # BLD AUTO: 125 10E9/L (ref 150–450)
PLATELET # BLD AUTO: 132 10E9/L (ref 150–450)
PLATELET # BLD AUTO: 138 10E9/L (ref 150–450)
PLATELET # BLD AUTO: 150 10E9/L (ref 150–450)
PLATELET # BLD AUTO: 156 THOU/UL (ref 140–440)
PLATELET # BLD AUTO: 158 10E9/L (ref 150–450)
PMV BLD AUTO: 9.7 FL (ref 8.5–12.5)
PO2 BLDV: 24 MM HG (ref 25–47)
POTASSIUM BLD-SCNC: 4.1 MMOL/L (ref 3.5–5)
POTASSIUM SERPL-SCNC: 3.4 MMOL/L (ref 3.4–5.3)
POTASSIUM SERPL-SCNC: 3.5 MMOL/L (ref 3.4–5.3)
POTASSIUM SERPL-SCNC: 3.6 MMOL/L (ref 3.4–5.3)
POTASSIUM SERPL-SCNC: 3.7 MMOL/L (ref 3.4–5.3)
POTASSIUM SERPL-SCNC: 3.9 MMOL/L (ref 3.4–5.3)
POTASSIUM SERPL-SCNC: 4.1 MMOL/L (ref 3.4–5.3)
POTASSIUM SERPL-SCNC: 4.1 MMOL/L (ref 3.4–5.3)
PROT SERPL-MCNC: 6 G/DL (ref 6.8–8.8)
PROT SERPL-MCNC: 6 G/DL (ref 6–8)
RBC # BLD AUTO: 2.95 10E12/L (ref 4.4–5.9)
RBC # BLD AUTO: 3.36 10E12/L (ref 4.4–5.9)
RBC # BLD AUTO: 3.5 10E12/L (ref 4.4–5.9)
RBC # BLD AUTO: 3.63 MILL/UL (ref 4.4–6.2)
RBC # BLD AUTO: 3.75 10E12/L (ref 4.4–5.9)
RBC # BLD AUTO: 4.2 10E12/L (ref 4.4–5.9)
RBC # BLD AUTO: 4.57 10E12/L (ref 4.4–5.9)
RBC #/AREA URNS AUTO: <1 /HPF (ref 0–2)
RBC #/AREA URNS AUTO: <1 /HPF (ref 0–2)
SAO2 % BLDV FROM PO2: 40 %
SODIUM SERPL-SCNC: 138 MMOL/L (ref 136–145)
SODIUM SERPL-SCNC: 139 MMOL/L (ref 133–144)
SODIUM SERPL-SCNC: 140 MMOL/L (ref 133–144)
SODIUM SERPL-SCNC: 143 MMOL/L (ref 133–144)
SOURCE: ABNORMAL
SOURCE: ABNORMAL
SP GR UR STRIP: 1.02 (ref 1–1.03)
SP GR UR STRIP: 1.02 (ref 1–1.03)
SPECIMEN EXP DATE BLD: NORMAL
SPECIMEN SOURCE: NORMAL
TIBC SERPL-MCNC: 195 UG/DL (ref 313–563)
TRANSFERRIN SERPL-MCNC: 156 MG/DL (ref 212–360)
TROPONIN I SERPL-MCNC: 0.03 UG/L (ref 0–0.04)
TROPONIN I SERPL-MCNC: 0.03 UG/L (ref 0–0.04)
UROBILINOGEN UR STRIP-MCNC: 0 MG/DL (ref 0–2)
UROBILINOGEN UR STRIP-MCNC: NEGATIVE MG/DL (ref 0–2)
WBC # BLD AUTO: 3.5 10E9/L (ref 4–11)
WBC # BLD AUTO: 3.8 10E9/L (ref 4–11)
WBC # BLD AUTO: 4.6 10E9/L (ref 4–11)
WBC # BLD AUTO: 4.7 10E9/L (ref 4–11)
WBC # BLD AUTO: 4.9 10E9/L (ref 4–11)
WBC # BLD AUTO: 6.5 10E9/L (ref 4–11)
WBC #/AREA URNS AUTO: 1 /HPF (ref 0–5)
WBC #/AREA URNS AUTO: 1 /HPF (ref 0–5)
WBC: 3.3 THOU/UL (ref 4–11)

## 2019-01-01 PROCEDURE — 25000128 H RX IP 250 OP 636: Performed by: INTERNAL MEDICINE

## 2019-01-01 PROCEDURE — 82565 ASSAY OF CREATININE: CPT | Performed by: INTERNAL MEDICINE

## 2019-01-01 PROCEDURE — 83605 ASSAY OF LACTIC ACID: CPT

## 2019-01-01 PROCEDURE — 70450 CT HEAD/BRAIN W/O DYE: CPT

## 2019-01-01 PROCEDURE — 25800030 ZZH RX IP 258 OP 636: Performed by: EMERGENCY MEDICINE

## 2019-01-01 PROCEDURE — 85025 COMPLETE CBC W/AUTO DIFF WBC: CPT | Performed by: EMERGENCY MEDICINE

## 2019-01-01 PROCEDURE — 84484 ASSAY OF TROPONIN QUANT: CPT | Performed by: EMERGENCY MEDICINE

## 2019-01-01 PROCEDURE — A9270 NON-COVERED ITEM OR SERVICE: HCPCS | Mod: GY | Performed by: INTERNAL MEDICINE

## 2019-01-01 PROCEDURE — 74177 CT ABD & PELVIS W/CONTRAST: CPT

## 2019-01-01 PROCEDURE — 25500064 ZZH RX 255 OP 636: Performed by: EMERGENCY MEDICINE

## 2019-01-01 PROCEDURE — 99223 1ST HOSP IP/OBS HIGH 75: CPT | Mod: AI | Performed by: INTERNAL MEDICINE

## 2019-01-01 PROCEDURE — G0378 HOSPITAL OBSERVATION PER HR: HCPCS

## 2019-01-01 PROCEDURE — 99207 ZZC CDG-CODE CATEGORY CHANGED: CPT | Performed by: INTERNAL MEDICINE

## 2019-01-01 PROCEDURE — 36415 COLL VENOUS BLD VENIPUNCTURE: CPT | Performed by: INTERNAL MEDICINE

## 2019-01-01 PROCEDURE — 85049 AUTOMATED PLATELET COUNT: CPT | Performed by: INTERNAL MEDICINE

## 2019-01-01 PROCEDURE — 71045 X-RAY EXAM CHEST 1 VIEW: CPT

## 2019-01-01 PROCEDURE — 99233 SBSQ HOSP IP/OBS HIGH 50: CPT | Performed by: INTERNAL MEDICINE

## 2019-01-01 PROCEDURE — 99285 EMERGENCY DEPT VISIT HI MDM: CPT | Mod: 25

## 2019-01-01 PROCEDURE — 92526 ORAL FUNCTION THERAPY: CPT | Mod: GN

## 2019-01-01 PROCEDURE — 25800030 ZZH RX IP 258 OP 636: Performed by: INTERNAL MEDICINE

## 2019-01-01 PROCEDURE — 90791 PSYCH DIAGNOSTIC EVALUATION: CPT | Performed by: PSYCHOLOGIST

## 2019-01-01 PROCEDURE — 12000000 ZZH R&B MED SURG/OB

## 2019-01-01 PROCEDURE — 87040 BLOOD CULTURE FOR BACTERIA: CPT | Performed by: EMERGENCY MEDICINE

## 2019-01-01 PROCEDURE — 00000146 ZZHCL STATISTIC GLUCOSE BY METER IP

## 2019-01-01 PROCEDURE — 81001 URINALYSIS AUTO W/SCOPE: CPT | Performed by: EMERGENCY MEDICINE

## 2019-01-01 PROCEDURE — 25000132 ZZH RX MED GY IP 250 OP 250 PS 637: Mod: GY | Performed by: PHYSICIAN ASSISTANT

## 2019-01-01 PROCEDURE — 25000128 H RX IP 250 OP 636: Performed by: EMERGENCY MEDICINE

## 2019-01-01 PROCEDURE — 25000132 ZZH RX MED GY IP 250 OP 250 PS 637: Mod: GY | Performed by: INTERNAL MEDICINE

## 2019-01-01 PROCEDURE — 83690 ASSAY OF LIPASE: CPT | Performed by: EMERGENCY MEDICINE

## 2019-01-01 PROCEDURE — 86900 BLOOD TYPING SEROLOGIC ABO: CPT | Performed by: EMERGENCY MEDICINE

## 2019-01-01 PROCEDURE — 25000131 ZZH RX MED GY IP 250 OP 636 PS 637: Mod: GY | Performed by: INTERNAL MEDICINE

## 2019-01-01 PROCEDURE — 73130 X-RAY EXAM OF HAND: CPT | Mod: RT

## 2019-01-01 PROCEDURE — A9585 GADOBUTROL INJECTION: HCPCS | Performed by: EMERGENCY MEDICINE

## 2019-01-01 PROCEDURE — 97161 PT EVAL LOW COMPLEX 20 MIN: CPT | Mod: GP

## 2019-01-01 PROCEDURE — 92526 ORAL FUNCTION THERAPY: CPT | Mod: GN | Performed by: SPEECH-LANGUAGE PATHOLOGIST

## 2019-01-01 PROCEDURE — 85652 RBC SED RATE AUTOMATED: CPT | Performed by: EMERGENCY MEDICINE

## 2019-01-01 PROCEDURE — A9270 NON-COVERED ITEM OR SERVICE: HCPCS | Mod: GY | Performed by: PHYSICIAN ASSISTANT

## 2019-01-01 PROCEDURE — 80053 COMPREHEN METABOLIC PANEL: CPT | Performed by: EMERGENCY MEDICINE

## 2019-01-01 PROCEDURE — 80048 BASIC METABOLIC PNL TOTAL CA: CPT | Performed by: INTERNAL MEDICINE

## 2019-01-01 PROCEDURE — 96375 TX/PRO/DX INJ NEW DRUG ADDON: CPT

## 2019-01-01 PROCEDURE — 96372 THER/PROPH/DIAG INJ SC/IM: CPT

## 2019-01-01 PROCEDURE — 85025 COMPLETE CBC W/AUTO DIFF WBC: CPT | Performed by: INTERNAL MEDICINE

## 2019-01-01 PROCEDURE — 86140 C-REACTIVE PROTEIN: CPT | Performed by: EMERGENCY MEDICINE

## 2019-01-01 PROCEDURE — 99239 HOSP IP/OBS DSCHRG MGMT >30: CPT | Performed by: INTERNAL MEDICINE

## 2019-01-01 PROCEDURE — 96360 HYDRATION IV INFUSION INIT: CPT

## 2019-01-01 PROCEDURE — 83036 HEMOGLOBIN GLYCOSYLATED A1C: CPT | Performed by: EMERGENCY MEDICINE

## 2019-01-01 PROCEDURE — 80048 BASIC METABOLIC PNL TOTAL CA: CPT | Performed by: EMERGENCY MEDICINE

## 2019-01-01 PROCEDURE — 99217 ZZC OBSERVATION CARE DISCHARGE: CPT | Performed by: INTERNAL MEDICINE

## 2019-01-01 PROCEDURE — 80048 BASIC METABOLIC PNL TOTAL CA: CPT | Performed by: PHYSICIAN ASSISTANT

## 2019-01-01 PROCEDURE — 87804 INFLUENZA ASSAY W/OPTIC: CPT | Performed by: EMERGENCY MEDICINE

## 2019-01-01 PROCEDURE — 99225 ZZC SUBSEQUENT OBSERVATION CARE,LEVEL II: CPT | Performed by: INTERNAL MEDICINE

## 2019-01-01 PROCEDURE — C9113 INJ PANTOPRAZOLE SODIUM, VIA: HCPCS | Performed by: EMERGENCY MEDICINE

## 2019-01-01 PROCEDURE — 86850 RBC ANTIBODY SCREEN: CPT | Performed by: EMERGENCY MEDICINE

## 2019-01-01 PROCEDURE — 99207 ZZC CDG-HISTORY COMP: MEETS EXP. PROB FOCUSED- DOWN CODED LACK OF PFSH: CPT | Performed by: PHYSICIAN ASSISTANT

## 2019-01-01 PROCEDURE — 93971 EXTREMITY STUDY: CPT | Mod: RT

## 2019-01-01 PROCEDURE — 99232 SBSQ HOSP IP/OBS MODERATE 35: CPT | Performed by: INTERNAL MEDICINE

## 2019-01-01 PROCEDURE — 86901 BLOOD TYPING SEROLOGIC RH(D): CPT | Performed by: EMERGENCY MEDICINE

## 2019-01-01 PROCEDURE — 96361 HYDRATE IV INFUSION ADD-ON: CPT

## 2019-01-01 PROCEDURE — 85027 COMPLETE CBC AUTOMATED: CPT | Performed by: INTERNAL MEDICINE

## 2019-01-01 PROCEDURE — 99218 ZZC INITIAL OBSERVATION CARE,LEVL I: CPT | Performed by: PHYSICIAN ASSISTANT

## 2019-01-01 PROCEDURE — 93005 ELECTROCARDIOGRAM TRACING: CPT

## 2019-01-01 PROCEDURE — 96365 THER/PROPH/DIAG IV INF INIT: CPT | Mod: 59

## 2019-01-01 PROCEDURE — 36415 COLL VENOUS BLD VENIPUNCTURE: CPT | Performed by: PHYSICIAN ASSISTANT

## 2019-01-01 PROCEDURE — 40000193 ZZH STATISTIC PT WARD VISIT

## 2019-01-01 PROCEDURE — 92610 EVALUATE SWALLOWING FUNCTION: CPT | Mod: GN | Performed by: SPEECH-LANGUAGE PATHOLOGIST

## 2019-01-01 PROCEDURE — 97116 GAIT TRAINING THERAPY: CPT | Mod: GP

## 2019-01-01 PROCEDURE — 82803 BLOOD GASES ANY COMBINATION: CPT

## 2019-01-01 PROCEDURE — 83036 HEMOGLOBIN GLYCOSYLATED A1C: CPT | Performed by: PHYSICIAN ASSISTANT

## 2019-01-01 PROCEDURE — 70553 MRI BRAIN STEM W/O & W/DYE: CPT

## 2019-01-01 PROCEDURE — G0463 HOSPITAL OUTPT CLINIC VISIT: HCPCS

## 2019-01-01 PROCEDURE — 36415 COLL VENOUS BLD VENIPUNCTURE: CPT | Performed by: EMERGENCY MEDICINE

## 2019-01-01 RX ORDER — QUETIAPINE FUMARATE 25 MG/1
25 TABLET, FILM COATED ORAL AT BEDTIME
Status: DISCONTINUED | OUTPATIENT
Start: 2019-01-01 | End: 2019-01-01 | Stop reason: HOSPADM

## 2019-01-01 RX ORDER — CEPHALEXIN 250 MG/5ML
500 POWDER, FOR SUSPENSION ORAL 4 TIMES DAILY
Qty: 200 ML | Refills: 0 | Status: SHIPPED | OUTPATIENT
Start: 2019-01-01 | End: 2019-01-01

## 2019-01-01 RX ORDER — PRAMIPEXOLE DIHYDROCHLORIDE 0.25 MG/1
0.75 TABLET ORAL AT BEDTIME
Status: DISCONTINUED | OUTPATIENT
Start: 2019-01-01 | End: 2019-01-01 | Stop reason: HOSPADM

## 2019-01-01 RX ORDER — ASPIRIN 81 MG/1
81 TABLET, CHEWABLE ORAL DAILY
Status: DISCONTINUED | OUTPATIENT
Start: 2019-01-01 | End: 2019-01-01 | Stop reason: HOSPADM

## 2019-01-01 RX ORDER — ONDANSETRON 2 MG/ML
4 INJECTION INTRAMUSCULAR; INTRAVENOUS EVERY 6 HOURS PRN
Status: DISCONTINUED | OUTPATIENT
Start: 2019-01-01 | End: 2019-01-01 | Stop reason: HOSPADM

## 2019-01-01 RX ORDER — NALOXONE HYDROCHLORIDE 0.4 MG/ML
.1-.4 INJECTION, SOLUTION INTRAMUSCULAR; INTRAVENOUS; SUBCUTANEOUS
Status: DISCONTINUED | OUTPATIENT
Start: 2019-01-01 | End: 2019-01-01 | Stop reason: HOSPADM

## 2019-01-01 RX ORDER — CEFTRIAXONE 1 G/1
1 INJECTION, POWDER, FOR SOLUTION INTRAMUSCULAR; INTRAVENOUS EVERY 24 HOURS
Status: DISCONTINUED | OUTPATIENT
Start: 2019-01-01 | End: 2019-01-01

## 2019-01-01 RX ORDER — ATORVASTATIN CALCIUM 40 MG/1
80 TABLET, FILM COATED ORAL DAILY
Status: DISCONTINUED | OUTPATIENT
Start: 2019-01-01 | End: 2019-01-01 | Stop reason: HOSPADM

## 2019-01-01 RX ORDER — ASPIRIN 81 MG/1
81 TABLET ORAL DAILY
Status: DISCONTINUED | OUTPATIENT
Start: 2019-01-01 | End: 2019-01-01 | Stop reason: HOSPADM

## 2019-01-01 RX ORDER — MEMANTINE HYDROCHLORIDE 5 MG/1
10 TABLET ORAL 2 TIMES DAILY
Status: DISCONTINUED | OUTPATIENT
Start: 2019-01-01 | End: 2019-01-01 | Stop reason: HOSPADM

## 2019-01-01 RX ORDER — NICOTINE POLACRILEX 4 MG
15-30 LOZENGE BUCCAL
Status: DISCONTINUED | OUTPATIENT
Start: 2019-01-01 | End: 2019-01-01 | Stop reason: HOSPADM

## 2019-01-01 RX ORDER — QUETIAPINE FUMARATE 25 MG/1
25 TABLET, FILM COATED ORAL AT BEDTIME
Qty: 30 TABLET | Refills: 0 | Status: SHIPPED | OUTPATIENT
Start: 2019-01-01 | End: 2019-01-01

## 2019-01-01 RX ORDER — CEPHALEXIN 250 MG/5ML
500 POWDER, FOR SUSPENSION ORAL 4 TIMES DAILY
Status: DISCONTINUED | OUTPATIENT
Start: 2019-01-01 | End: 2019-01-01 | Stop reason: HOSPADM

## 2019-01-01 RX ORDER — CARBIDOPA AND LEVODOPA 25; 100 MG/1; MG/1
2 TABLET ORAL 3 TIMES DAILY
Status: DISCONTINUED | OUTPATIENT
Start: 2019-01-01 | End: 2019-01-01 | Stop reason: HOSPADM

## 2019-01-01 RX ORDER — CEFTRIAXONE 1 G/1
1 INJECTION, POWDER, FOR SOLUTION INTRAMUSCULAR; INTRAVENOUS ONCE
Status: COMPLETED | OUTPATIENT
Start: 2019-01-01 | End: 2019-01-01

## 2019-01-01 RX ORDER — SODIUM CHLORIDE 9 MG/ML
1000 INJECTION, SOLUTION INTRAVENOUS CONTINUOUS
Status: DISCONTINUED | OUTPATIENT
Start: 2019-01-01 | End: 2019-01-01

## 2019-01-01 RX ORDER — IOPAMIDOL 755 MG/ML
500 INJECTION, SOLUTION INTRAVASCULAR ONCE
Status: COMPLETED | OUTPATIENT
Start: 2019-01-01 | End: 2019-01-01

## 2019-01-01 RX ORDER — TRAZODONE HYDROCHLORIDE 50 MG/1
50 TABLET, FILM COATED ORAL AT BEDTIME
Status: DISCONTINUED | OUTPATIENT
Start: 2019-01-01 | End: 2019-01-01 | Stop reason: HOSPADM

## 2019-01-01 RX ORDER — ACETAMINOPHEN 650 MG/1
650 SUPPOSITORY RECTAL EVERY 4 HOURS PRN
Status: DISCONTINUED | OUTPATIENT
Start: 2019-01-01 | End: 2019-01-01 | Stop reason: HOSPADM

## 2019-01-01 RX ORDER — DONEPEZIL HYDROCHLORIDE 10 MG/1
10 TABLET, FILM COATED ORAL DAILY
Status: DISCONTINUED | OUTPATIENT
Start: 2019-01-01 | End: 2019-01-01 | Stop reason: HOSPADM

## 2019-01-01 RX ORDER — ONDANSETRON 4 MG/1
4 TABLET, ORALLY DISINTEGRATING ORAL EVERY 6 HOURS PRN
Status: DISCONTINUED | OUTPATIENT
Start: 2019-01-01 | End: 2019-01-01 | Stop reason: HOSPADM

## 2019-01-01 RX ORDER — GADOBUTROL 604.72 MG/ML
7.5 INJECTION INTRAVENOUS ONCE
Status: COMPLETED | OUTPATIENT
Start: 2019-01-01 | End: 2019-01-01

## 2019-01-01 RX ORDER — CARBIDOPA AND LEVODOPA 25; 100 MG/1; MG/1
1 TABLET ORAL DAILY PRN
Status: DISCONTINUED | OUTPATIENT
Start: 2019-01-01 | End: 2019-01-01 | Stop reason: HOSPADM

## 2019-01-01 RX ORDER — BISACODYL 10 MG
10 SUPPOSITORY, RECTAL RECTAL DAILY PRN
Status: DISCONTINUED | OUTPATIENT
Start: 2019-01-01 | End: 2019-01-01 | Stop reason: HOSPADM

## 2019-01-01 RX ORDER — ASCORBIC ACID 500 MG
500 TABLET ORAL DAILY
COMMUNITY
End: 2019-01-01

## 2019-01-01 RX ORDER — MODAFINIL 200 MG/1
200 TABLET ORAL
COMMUNITY
End: 2019-01-01

## 2019-01-01 RX ORDER — NITROGLYCERIN 0.4 MG/1
0.4 TABLET SUBLINGUAL EVERY 5 MIN PRN
COMMUNITY

## 2019-01-01 RX ORDER — PRAMIPEXOLE DIHYDROCHLORIDE 0.75 MG/1
0.38 TABLET ORAL AT BEDTIME
COMMUNITY

## 2019-01-01 RX ORDER — SODIUM CHLORIDE 9 MG/ML
INJECTION, SOLUTION INTRAVENOUS CONTINUOUS
Status: DISCONTINUED | OUTPATIENT
Start: 2019-01-01 | End: 2019-01-01 | Stop reason: HOSPADM

## 2019-01-01 RX ORDER — PRAMIPEXOLE DIHYDROCHLORIDE 0.12 MG/1
0.38 TABLET ORAL AT BEDTIME
Status: DISCONTINUED | OUTPATIENT
Start: 2019-01-01 | End: 2019-01-01 | Stop reason: HOSPADM

## 2019-01-01 RX ORDER — MULTIPLE VITAMINS W/ MINERALS TAB 9MG-400MCG
1 TAB ORAL DAILY
Status: DISCONTINUED | OUTPATIENT
Start: 2019-01-01 | End: 2019-01-01 | Stop reason: HOSPADM

## 2019-01-01 RX ORDER — NITROGLYCERIN 0.4 MG/1
0.4 TABLET SUBLINGUAL EVERY 5 MIN PRN
Status: DISCONTINUED | OUTPATIENT
Start: 2019-01-01 | End: 2019-01-01 | Stop reason: HOSPADM

## 2019-01-01 RX ORDER — LIDOCAINE 40 MG/G
CREAM TOPICAL
Status: DISCONTINUED | OUTPATIENT
Start: 2019-01-01 | End: 2019-01-01 | Stop reason: HOSPADM

## 2019-01-01 RX ORDER — CEPHALEXIN 500 MG/1
500 CAPSULE ORAL 4 TIMES DAILY
Qty: 28 CAPSULE | Refills: 0 | Status: SHIPPED | OUTPATIENT
Start: 2019-01-01 | End: 2019-01-01

## 2019-01-01 RX ORDER — CHOLECALCIFEROL (VITAMIN D3) 125 MCG
2000 TABLET ORAL DAILY
COMMUNITY
End: 2019-01-01

## 2019-01-01 RX ORDER — DEXTROSE MONOHYDRATE 25 G/50ML
25-50 INJECTION, SOLUTION INTRAVENOUS
Status: DISCONTINUED | OUTPATIENT
Start: 2019-01-01 | End: 2019-01-01 | Stop reason: HOSPADM

## 2019-01-01 RX ORDER — DONEPEZIL HYDROCHLORIDE 10 MG/1
10 TABLET, FILM COATED ORAL 2 TIMES DAILY
Status: DISCONTINUED | OUTPATIENT
Start: 2019-01-01 | End: 2019-01-01 | Stop reason: HOSPADM

## 2019-01-01 RX ORDER — CARBIDOPA AND LEVODOPA 25; 100 MG/1; MG/1
1 TABLET ORAL DAILY PRN
COMMUNITY

## 2019-01-01 RX ORDER — ACETAMINOPHEN 325 MG/1
650 TABLET ORAL EVERY 4 HOURS PRN
Status: DISCONTINUED | OUTPATIENT
Start: 2019-01-01 | End: 2019-01-01 | Stop reason: HOSPADM

## 2019-01-01 RX ORDER — PHENOL 1.4 %
15 AEROSOL, SPRAY (ML) MUCOUS MEMBRANE AT BEDTIME
COMMUNITY

## 2019-01-01 RX ADMIN — GADOBUTROL 7.5 ML: 604.72 INJECTION INTRAVENOUS at 12:47

## 2019-01-01 RX ADMIN — ATORVASTATIN CALCIUM 80 MG: 40 TABLET, FILM COATED ORAL at 09:39

## 2019-01-01 RX ADMIN — MEMANTINE 10 MG: 5 TABLET ORAL at 08:44

## 2019-01-01 RX ADMIN — INSULIN ASPART 2 UNITS: 100 INJECTION, SOLUTION INTRAVENOUS; SUBCUTANEOUS at 14:43

## 2019-01-01 RX ADMIN — CARBIDOPA AND LEVODOPA 2 TABLET: 25; 100 TABLET ORAL at 16:46

## 2019-01-01 RX ADMIN — SODIUM CHLORIDE 62 ML: 9 INJECTION, SOLUTION INTRAVENOUS at 15:42

## 2019-01-01 RX ADMIN — CARBIDOPA AND LEVODOPA 2 TABLET: 25; 100 TABLET ORAL at 13:01

## 2019-01-01 RX ADMIN — ASPIRIN 81 MG: 81 TABLET, COATED ORAL at 09:40

## 2019-01-01 RX ADMIN — CEPHALEXIN 500 MG: 250 POWDER, FOR SUSPENSION ORAL at 16:46

## 2019-01-01 RX ADMIN — SODIUM CHLORIDE: 9 INJECTION, SOLUTION INTRAVENOUS at 04:45

## 2019-01-01 RX ADMIN — SODIUM CHLORIDE: 9 INJECTION, SOLUTION INTRAVENOUS at 09:02

## 2019-01-01 RX ADMIN — SODIUM CHLORIDE: 9 INJECTION, SOLUTION INTRAVENOUS at 01:20

## 2019-01-01 RX ADMIN — ASPIRIN 81 MG: 81 TABLET, COATED ORAL at 08:09

## 2019-01-01 RX ADMIN — SODIUM CHLORIDE: 9 INJECTION, SOLUTION INTRAVENOUS at 10:58

## 2019-01-01 RX ADMIN — MEMANTINE 10 MG: 5 TABLET ORAL at 22:30

## 2019-01-01 RX ADMIN — INSULIN ASPART 1 UNITS: 100 INJECTION, SOLUTION INTRAVENOUS; SUBCUTANEOUS at 06:48

## 2019-01-01 RX ADMIN — CARBIDOPA AND LEVODOPA 2 TABLET: 25; 100 TABLET ORAL at 19:59

## 2019-01-01 RX ADMIN — CARBIDOPA AND LEVODOPA 2 TABLET: 25; 100 TABLET ORAL at 12:37

## 2019-01-01 RX ADMIN — INSULIN ASPART 2 UNITS: 100 INJECTION, SOLUTION INTRAVENOUS; SUBCUTANEOUS at 18:49

## 2019-01-01 RX ADMIN — Medication 15 MG: at 21:19

## 2019-01-01 RX ADMIN — PRAMIPEXOLE DIHYDROCHLORIDE 0.75 MG: 0.25 TABLET ORAL at 21:20

## 2019-01-01 RX ADMIN — ATORVASTATIN CALCIUM 80 MG: 40 TABLET, FILM COATED ORAL at 09:44

## 2019-01-01 RX ADMIN — INSULIN ASPART 1 UNITS: 100 INJECTION, SOLUTION INTRAVENOUS; SUBCUTANEOUS at 02:35

## 2019-01-01 RX ADMIN — QUETIAPINE FUMARATE 25 MG: 25 TABLET ORAL at 21:20

## 2019-01-01 RX ADMIN — CARBIDOPA AND LEVODOPA 2 TABLET: 25; 100 TABLET ORAL at 16:21

## 2019-01-01 RX ADMIN — ASPIRIN 81 MG: 81 TABLET, COATED ORAL at 08:44

## 2019-01-01 RX ADMIN — CEFTRIAXONE SODIUM 1 G: 1 INJECTION, POWDER, FOR SOLUTION INTRAMUSCULAR; INTRAVENOUS at 16:21

## 2019-01-01 RX ADMIN — CARBIDOPA AND LEVODOPA 2 TABLET: 25; 100 TABLET ORAL at 22:30

## 2019-01-01 RX ADMIN — MEMANTINE 10 MG: 5 TABLET ORAL at 09:40

## 2019-01-01 RX ADMIN — DONEPEZIL HYDROCHLORIDE 10 MG: 10 TABLET ORAL at 08:09

## 2019-01-01 RX ADMIN — MEMANTINE 10 MG: 5 TABLET ORAL at 08:09

## 2019-01-01 RX ADMIN — MEMANTINE 10 MG: 5 TABLET ORAL at 21:19

## 2019-01-01 RX ADMIN — CARBIDOPA AND LEVODOPA 2 TABLET: 25; 100 TABLET ORAL at 16:04

## 2019-01-01 RX ADMIN — INSULIN ASPART 1 UNITS: 100 INJECTION, SOLUTION INTRAVENOUS; SUBCUTANEOUS at 02:08

## 2019-01-01 RX ADMIN — TRAZODONE HYDROCHLORIDE 50 MG: 50 TABLET ORAL at 21:21

## 2019-01-01 RX ADMIN — TRAZODONE HYDROCHLORIDE 50 MG: 50 TABLET ORAL at 21:19

## 2019-01-01 RX ADMIN — Medication 15 MG: at 21:21

## 2019-01-01 RX ADMIN — SODIUM CHLORIDE 500 ML: 9 INJECTION, SOLUTION INTRAVENOUS at 12:09

## 2019-01-01 RX ADMIN — ATORVASTATIN CALCIUM 80 MG: 40 TABLET, FILM COATED ORAL at 08:09

## 2019-01-01 RX ADMIN — Medication 10 MG: at 21:20

## 2019-01-01 RX ADMIN — ASPIRIN 81 MG: 81 TABLET, COATED ORAL at 09:31

## 2019-01-01 RX ADMIN — CARBIDOPA AND LEVODOPA 2 TABLET: 25; 100 TABLET ORAL at 19:12

## 2019-01-01 RX ADMIN — ATORVASTATIN CALCIUM 80 MG: 40 TABLET, FILM COATED ORAL at 19:11

## 2019-01-01 RX ADMIN — CARBIDOPA AND LEVODOPA 2 TABLET: 25; 100 TABLET ORAL at 08:44

## 2019-01-01 RX ADMIN — MULTIPLE VITAMINS W/ MINERALS TAB 1 TABLET: TAB at 19:11

## 2019-01-01 RX ADMIN — MEMANTINE 10 MG: 5 TABLET ORAL at 09:31

## 2019-01-01 RX ADMIN — INSULIN ASPART 1 UNITS: 100 INJECTION, SOLUTION INTRAVENOUS; SUBCUTANEOUS at 01:38

## 2019-01-01 RX ADMIN — SODIUM CHLORIDE: 9 INJECTION, SOLUTION INTRAVENOUS at 19:36

## 2019-01-01 RX ADMIN — CEFTRIAXONE SODIUM 1 G: 1 INJECTION, POWDER, FOR SOLUTION INTRAMUSCULAR; INTRAVENOUS at 17:05

## 2019-01-01 RX ADMIN — DONEPEZIL HYDROCHLORIDE 10 MG: 10 TABLET ORAL at 09:40

## 2019-01-01 RX ADMIN — MEMANTINE 10 MG: 5 TABLET ORAL at 20:10

## 2019-01-01 RX ADMIN — ASPIRIN 81 MG: 81 TABLET, COATED ORAL at 09:44

## 2019-01-01 RX ADMIN — METFORMIN HYDROCHLORIDE 500 MG: 500 TABLET ORAL at 17:47

## 2019-01-01 RX ADMIN — BISACODYL 10 MG: 10 SUPPOSITORY RECTAL at 14:12

## 2019-01-01 RX ADMIN — CEFTRIAXONE SODIUM 1 G: 1 INJECTION, POWDER, FOR SOLUTION INTRAMUSCULAR; INTRAVENOUS at 16:03

## 2019-01-01 RX ADMIN — IOPAMIDOL 89 ML: 755 INJECTION, SOLUTION INTRAVENOUS at 15:41

## 2019-01-01 RX ADMIN — ATORVASTATIN CALCIUM 80 MG: 40 TABLET, FILM COATED ORAL at 14:12

## 2019-01-01 RX ADMIN — INSULIN ASPART 1 UNITS: 100 INJECTION, SOLUTION INTRAVENOUS; SUBCUTANEOUS at 22:28

## 2019-01-01 RX ADMIN — INSULIN ASPART 1 UNITS: 100 INJECTION, SOLUTION INTRAVENOUS; SUBCUTANEOUS at 18:36

## 2019-01-01 RX ADMIN — INSULIN ASPART 1 UNITS: 100 INJECTION, SOLUTION INTRAVENOUS; SUBCUTANEOUS at 09:58

## 2019-01-01 RX ADMIN — INSULIN ASPART 2 UNITS: 100 INJECTION, SOLUTION INTRAVENOUS; SUBCUTANEOUS at 21:29

## 2019-01-01 RX ADMIN — ASPIRIN 81 MG: 81 TABLET, COATED ORAL at 19:11

## 2019-01-01 RX ADMIN — MULTIPLE VITAMINS W/ MINERALS TAB 1 TABLET: TAB at 09:31

## 2019-01-01 RX ADMIN — PRAMIPEXOLE DIHYDROCHLORIDE 0.38 MG: 0.12 TABLET ORAL at 21:21

## 2019-01-01 RX ADMIN — INSULIN ASPART 1 UNITS: 100 INJECTION, SOLUTION INTRAVENOUS; SUBCUTANEOUS at 09:52

## 2019-01-01 RX ADMIN — SODIUM CHLORIDE 1000 ML: 9 INJECTION, SOLUTION INTRAVENOUS at 14:52

## 2019-01-01 RX ADMIN — PRAMIPEXOLE DIHYDROCHLORIDE 0.38 MG: 0.12 TABLET ORAL at 21:20

## 2019-01-01 RX ADMIN — SODIUM CHLORIDE 1000 ML: 9 INJECTION, SOLUTION INTRAVENOUS at 17:09

## 2019-01-01 RX ADMIN — Medication 10 MG: at 22:31

## 2019-01-01 RX ADMIN — INSULIN ASPART 1 UNITS: 100 INJECTION, SOLUTION INTRAVENOUS; SUBCUTANEOUS at 07:06

## 2019-01-01 RX ADMIN — CEPHALEXIN 500 MG: 250 POWDER, FOR SUSPENSION ORAL at 12:37

## 2019-01-01 RX ADMIN — CARBIDOPA AND LEVODOPA 2 TABLET: 25; 100 TABLET ORAL at 14:13

## 2019-01-01 RX ADMIN — METFORMIN HYDROCHLORIDE 500 MG: 500 TABLET ORAL at 08:09

## 2019-01-01 RX ADMIN — CARBIDOPA AND LEVODOPA 2 TABLET: 25; 100 TABLET ORAL at 09:44

## 2019-01-01 RX ADMIN — SODIUM CHLORIDE 1000 ML: 9 INJECTION, SOLUTION INTRAVENOUS at 01:09

## 2019-01-01 RX ADMIN — SODIUM CHLORIDE: 9 INJECTION, SOLUTION INTRAVENOUS at 23:59

## 2019-01-01 RX ADMIN — ENOXAPARIN SODIUM 40 MG: 40 INJECTION SUBCUTANEOUS at 14:14

## 2019-01-01 RX ADMIN — INSULIN ASPART 3 UNITS: 100 INJECTION, SOLUTION INTRAVENOUS; SUBCUTANEOUS at 14:07

## 2019-01-01 RX ADMIN — DONEPEZIL HYDROCHLORIDE 10 MG: 10 TABLET ORAL at 22:31

## 2019-01-01 RX ADMIN — PANTOPRAZOLE SODIUM 40 MG: 40 INJECTION, POWDER, FOR SOLUTION INTRAVENOUS at 17:05

## 2019-01-01 RX ADMIN — INSULIN ASPART 1 UNITS: 100 INJECTION, SOLUTION INTRAVENOUS; SUBCUTANEOUS at 06:47

## 2019-01-01 RX ADMIN — MEMANTINE 10 MG: 5 TABLET ORAL at 20:00

## 2019-01-01 RX ADMIN — PRAMIPEXOLE DIHYDROCHLORIDE 0.75 MG: 0.25 TABLET ORAL at 22:30

## 2019-01-01 RX ADMIN — DONEPEZIL HYDROCHLORIDE 10 MG: 10 TABLET ORAL at 14:13

## 2019-01-01 RX ADMIN — CARBIDOPA AND LEVODOPA 2 TABLET: 25; 100 TABLET ORAL at 16:30

## 2019-01-01 RX ADMIN — INSULIN ASPART 3 UNITS: 100 INJECTION, SOLUTION INTRAVENOUS; SUBCUTANEOUS at 10:40

## 2019-01-01 RX ADMIN — PRAMIPEXOLE DIHYDROCHLORIDE 0.38 MG: 0.12 TABLET ORAL at 20:10

## 2019-01-01 RX ADMIN — CARBIDOPA AND LEVODOPA 2 TABLET: 25; 100 TABLET ORAL at 09:31

## 2019-01-01 RX ADMIN — CARBIDOPA AND LEVODOPA 2 TABLET: 25; 100 TABLET ORAL at 09:40

## 2019-01-01 RX ADMIN — ENOXAPARIN SODIUM 40 MG: 40 INJECTION SUBCUTANEOUS at 14:24

## 2019-01-01 RX ADMIN — ENOXAPARIN SODIUM 40 MG: 40 INJECTION SUBCUTANEOUS at 14:35

## 2019-01-01 RX ADMIN — DONEPEZIL HYDROCHLORIDE 10 MG: 10 TABLET ORAL at 09:44

## 2019-01-01 RX ADMIN — ATORVASTATIN CALCIUM 80 MG: 40 TABLET, FILM COATED ORAL at 08:44

## 2019-01-01 RX ADMIN — INSULIN ASPART 2 UNITS: 100 INJECTION, SOLUTION INTRAVENOUS; SUBCUTANEOUS at 17:45

## 2019-01-01 RX ADMIN — INSULIN ASPART 3 UNITS: 100 INJECTION, SOLUTION INTRAVENOUS; SUBCUTANEOUS at 17:39

## 2019-01-01 RX ADMIN — CEPHALEXIN 500 MG: 250 POWDER, FOR SUSPENSION ORAL at 08:15

## 2019-01-01 RX ADMIN — TRAZODONE HYDROCHLORIDE 50 MG: 50 TABLET ORAL at 20:10

## 2019-01-01 RX ADMIN — INSULIN ASPART 2 UNITS: 100 INJECTION, SOLUTION INTRAVENOUS; SUBCUTANEOUS at 21:20

## 2019-01-01 RX ADMIN — ASPIRIN 81 MG: 81 TABLET, COATED ORAL at 14:13

## 2019-01-01 RX ADMIN — CARBIDOPA AND LEVODOPA 2 TABLET: 25; 100 TABLET ORAL at 21:20

## 2019-01-01 RX ADMIN — Medication 15 MG: at 20:09

## 2019-01-01 RX ADMIN — ENOXAPARIN SODIUM 40 MG: 40 INJECTION SUBCUTANEOUS at 14:43

## 2019-01-01 RX ADMIN — MULTIPLE VITAMINS W/ MINERALS TAB 1 TABLET: TAB at 09:40

## 2019-01-01 RX ADMIN — INSULIN ASPART 2 UNITS: 100 INJECTION, SOLUTION INTRAVENOUS; SUBCUTANEOUS at 22:21

## 2019-01-01 RX ADMIN — CARBIDOPA AND LEVODOPA 2 TABLET: 25; 100 TABLET ORAL at 12:40

## 2019-01-01 RX ADMIN — DONEPEZIL HYDROCHLORIDE 10 MG: 10 TABLET ORAL at 09:31

## 2019-01-01 RX ADMIN — DONEPEZIL HYDROCHLORIDE 10 MG: 10 TABLET ORAL at 21:20

## 2019-01-01 RX ADMIN — SODIUM CHLORIDE: 9 INJECTION, SOLUTION INTRAVENOUS at 09:57

## 2019-01-01 RX ADMIN — INSULIN ASPART 2 UNITS: 100 INJECTION, SOLUTION INTRAVENOUS; SUBCUTANEOUS at 13:40

## 2019-01-01 RX ADMIN — INSULIN ASPART 2 UNITS: 100 INJECTION, SOLUTION INTRAVENOUS; SUBCUTANEOUS at 14:35

## 2019-01-01 RX ADMIN — MEMANTINE 10 MG: 5 TABLET ORAL at 09:44

## 2019-01-01 RX ADMIN — DONEPEZIL HYDROCHLORIDE 10 MG: 10 TABLET ORAL at 08:44

## 2019-01-01 RX ADMIN — SODIUM CHLORIDE: 9 INJECTION, SOLUTION INTRAVENOUS at 21:18

## 2019-01-01 RX ADMIN — INSULIN ASPART 1 UNITS: 100 INJECTION, SOLUTION INTRAVENOUS; SUBCUTANEOUS at 14:24

## 2019-01-01 RX ADMIN — CEPHALEXIN 500 MG: 250 POWDER, FOR SUSPENSION ORAL at 21:33

## 2019-01-01 RX ADMIN — CARBIDOPA AND LEVODOPA 2 TABLET: 25; 100 TABLET ORAL at 08:09

## 2019-01-01 RX ADMIN — INSULIN ASPART 1 UNITS: 100 INJECTION, SOLUTION INTRAVENOUS; SUBCUTANEOUS at 09:16

## 2019-01-01 RX ADMIN — INSULIN ASPART 1 UNITS: 100 INJECTION, SOLUTION INTRAVENOUS; SUBCUTANEOUS at 11:01

## 2019-01-01 RX ADMIN — QUETIAPINE FUMARATE 25 MG: 25 TABLET ORAL at 22:30

## 2019-01-01 RX ADMIN — ATORVASTATIN CALCIUM 80 MG: 40 TABLET, FILM COATED ORAL at 09:31

## 2019-01-01 ASSESSMENT — ACTIVITIES OF DAILY LIVING (ADL)
ADLS_ACUITY_SCORE: 45
ADLS_ACUITY_SCORE: 44
ADLS_ACUITY_SCORE: 45
ADLS_ACUITY_SCORE: 43
ADLS_ACUITY_SCORE: 43
ADLS_ACUITY_SCORE: 45
ADLS_ACUITY_SCORE: 45
ADLS_ACUITY_SCORE: 44
ADLS_ACUITY_SCORE: 44
NUMBER_OF_TIMES_PATIENT_HAS_FALLEN_WITHIN_LAST_SIX_MONTHS: 2
ADLS_ACUITY_SCORE: 45
ADLS_ACUITY_SCORE: 45
ADLS_ACUITY_SCORE: 43
ADLS_ACUITY_SCORE: 45
ADLS_ACUITY_SCORE: 44
FALL_HISTORY_WITHIN_LAST_SIX_MONTHS: YES
ADLS_ACUITY_SCORE: 45
ADLS_ACUITY_SCORE: 44
ADLS_ACUITY_SCORE: 45
ADLS_ACUITY_SCORE: 44
ADLS_ACUITY_SCORE: 45
ADLS_ACUITY_SCORE: 44
ADLS_ACUITY_SCORE: 45
ADLS_ACUITY_SCORE: 44

## 2019-01-01 ASSESSMENT — ENCOUNTER SYMPTOMS
WEAKNESS: 1
SPEECH DIFFICULTY: 1
FEVER: 0
CONFUSION: 1

## 2019-01-01 ASSESSMENT — MIFFLIN-ST. JEOR: SCORE: 1543.19

## 2019-01-29 PROBLEM — R41.82 CHANGE IN MENTAL STATE: Status: ACTIVE | Noted: 2019-01-01

## 2019-01-29 NOTE — ED PROVIDER NOTES
History     Chief Complaint:  Altered mental status     HPI:   The history is provided by the spouse and the patient. The history is limited by the condition of the patient.      Josafat Matson is a 78 year old male with a history of Parkinson's disease, Lewy body dementia, CAD, atrial fibrillation, type II diabetes mellitus, hypertension, and hyperlipidemia who presents to the emergency department via EMS for evaluation of altered mental status. Per wive, the patient has had increased weakness, confusion, and slurred speech in the last two days with acute worsening today. Last night he was snapping his fingers throughout the night and crawling on the floor due to weakness. He is normally able to ambulate and speak quite well per his wife. He has also had hallucinations which are new for him. EMS was activated and he was brought to the emergency department for evaluation. Here, the patient denies any pain.     CARDIAC RISK FACTORS:  Sex:    Male   Tobacco:   Positive (former)  Hypertension:   Positive   Hyperlipidemia:  Positive   Diabetes:   Positive   Family History:  Negative     Allergies:  Tamsulosin - dizzy   Terazosin - dizzy      Medications:    Aspirin   Atorvastatin   Carbidopa - levodopa   Aricept   Metformin   Provigil   Nitroglycerin   Mirapex     Past Medical History:    Atrial fibrillation   Alzheimer's disease   CAD  Type II diabetes mellitus   MI   Hyperlipidemia   Hypertension   Parkinson's disease   Sleep apnea     Past Surgical History:    Stented coronary artery   Colonoscopy    Cataract   Phacoemulsification clear cornea with standard intraocular lens     Family History:    Noncontributory     Social History:  Presents via EMS    Tobacco use: Former smoker, quit 1990   Alcohol use: Yes   PCP: MARC CASTELAN    Marital Status:        Review of Systems   Unable to perform ROS: Mental status change   Musculoskeletal: Positive for gait problem.   Neurological: Positive for speech  difficulty and weakness.   Psychiatric/Behavioral: Positive for confusion.   Limited due to Alzheimer's, Parkinson's and AMS    Physical Exam     Patient Vitals for the past 24 hrs:   BP Temp Temp src Pulse Resp SpO2 Weight   01/29/19 1330 129/77 -- -- 78 -- -- --   01/29/19 1315 127/72 -- -- 74 -- -- --   01/29/19 1200 138/70 -- -- 71 -- 99 % --   01/29/19 1045 141/76 -- -- 85 -- -- --   01/29/19 1042 155/78 98.5  F (36.9  C) Oral 85 20 98 % 79.4 kg (175 lb)        Physical Exam  General: Patient is alert and interactive when I enter the room, falls asleep easily   Head:  The scalp, face, and head appear normal  Eyes:  Conjunctivae are normal  ENT:    The nose is normal    Pinnae are normal    External acoustic canals are normal    Dry mucous membranes   Neck:  Trachea midline  CV:  Pulses are normal, RRR.    Resp:  No respiratory distress, CTAB   Abdomen:      Soft, non-tender, non-distended  Musc:  Normal muscular tone    No major joint effusions    No asymmetric leg swelling  Skin:  No rash or lesions noted  Neuro:  Speech is slowed and difficult to understand at times. Face is symmetric.  Moving all extremities well. Unable to do complex commands. Follows simple commands. Oriented to place and person but not time.     Psych: Awake. Alert.  Normal affect.  Appropriate interactions.    Emergency Department Course   ECG (10:42:52):  Rate 88 bpm. AR interval 194. QRS duration 94. QT/QTc 378/457. P-R-T axes 73 49 69. Normal sinus rhythm. Normal ECG. Interpreted at 1042 by Araceli Huston MD.     Imaging:  Radiographic findings were communicated with the family who voiced understanding of the findings.    MR Brain without & with contrast:  IMPRESSION:    1. No evidence of acute infarct, mass, hemorrhage, or herniation.  2. Mild diffuse parenchymal volume loss and white matter changes  likely due to chronic microvascular ischemic disease.  Per radiology report     CT Head without contrast:  IMPRESSION:     1. No  evidence of acute intracranial hemorrhage, mass, or herniation.  2. Mild diffuse parenchymal volume loss and white matter changes  likely due to chronic microvascular ischemic disease. No change since  Prior.    Imaging independently reviewed and agree with radiologist interpretation.      Laboratory:  UA with Microscopic: Ketone 5, Mucous present, ow Negative   CBC: WNL (WBC 4.6, HGB 13.5, )   BMP: Glucose 179 (H), ow WNL (Creatinine 0.96)   1051: Troponin I: 0.029    Interventions:  1209:  ml IV Bolus       Emergency Department Course:  1043: I performed an exam of the patient and obtained history, as documented above.     Past medical records, nursing notes, and vitals reviewed.    No stroke code     The patient was sent for a CT while in the emergency department, findings above.     1157: I rechecked the patient. Explained findings to spouse.    The patient was sent for a MR while in the emergency department, findings above.      Findings and plan explained to the spouse who consents to admission.     1328: Discussed the patient with SANCHEZ Mary for Dr. Black, who will admit the patient to a observation bed for further monitoring, evaluation, and treatment.      Impression & Plan      Medical Decision Making:  Josafat Matson is a 78 year old male with a history of Lewy body dementia, parkinson's, CAD, and atrial fibrillation who presents with altered mental status. Patient arrives with his wife who is concerned that over the past few days he has been altered and confused. He does have dementia, however, this is definitely changed from his baseline. Normally he is fairly alert and can have a fairly reasonable conversation. He is usually ambulatory, however, he has been crawling on the floor, hallucinating, and having difficulty following commands for the past few days. He arrives here and I do not think we can call code stroke given his symptoms have been going on for two days. He is able to converse  with me although it is difficult to understand him. He cannot follow complex commands but can follow simple ones. He is moving all four extremities. He does look dehydrated based on his mucous membranes. His EKG revealed normal sinus rhythm with no signs of atrial fibrillation. Of note, he is no longer on anticoagulation. His CBC and BMP were normal. Urine showed no signs of infection. We did do a stat head CT which showed no signs of intracranial hemorrhage. Given his significant change in mental status, we did do an MRI which revealed no acute abnormalities. I had a long discussion with his wife, and she does not feel comfortable going home with him like this. Certainly, this could be progression of his Lewy body dementia but I cannot rule out other etiologies. However, unlikely to be a stroke with a negative MRI and no obvious infectious symptoms. He might need placement if this is his new baseline. Patient will be admitted to inpatient in stable condition.     Diagnosis:    ICD-10-CM    1. Altered mental status, unspecified altered mental status type R41.82        Disposition:  Admitted to [facility] Mary for Dr. Black, for further evaluation and care.      Scribe Disclosure:   I, Flaco Julio, am serving as a scribe at 10:44 AM on 1/29/2019 to document services personally performed by Araceli Huston MD based on my observations and the provider's statements to me.       Araceli Huston MD  1/29/2019   [facility] EMERGENCY DEPARTMENT       Araceli Huston MD  01/30/19 0759

## 2019-01-29 NOTE — PHARMACY-ADMISSION MEDICATION HISTORY
Admission medication history interview status for this patient is complete. See [facility] admission navigator for allergy information, prior to admission medications and immunization status.     Medication history interview source(s):PT wife  Medication history resources (including written lists, pill bottles, clinic record):[facility] list, list from pt wife  Primary pharmacy:[facility]    Changes made to PTA medication list:  Added: melatonin, vitamin c, vitamin d  Deleted: b12, modafinil, pramipexol 0.5-0.75mg daily  Changed: mirapexol from 0.5mg at bedtime to 0.75mg at bedtime, aspirin from 325mg daily to 81mg daily, added frequency to sinemet, aricept frequency added, memantine from daily to bid    Actions taken by pharmacist (provider contacted, etc):None     Additional medication history information:None    Medication reconciliation/reorder completed by provider prior to medication history? No      For patients on insulin therapy:N    Prior to Admission medications    Medication Sig Last Dose Taking? Auth Provider   aspirin (ASA) 81 MG EC tablet Take 81 mg by mouth daily 1/28/2019 at am Yes Unknown, Entered By History   Atorvastatin Calcium (LIPITOR PO) Take 80 mg by mouth daily 1/28/2019 at am Yes Reported, Patient   carbidopa-levodopa (SINEMET)  MG per tablet Take 2 tablets by mouth 3 times daily  1/28/2019 at pm Yes Reported, Patient   donepezil (ARICEPT) 10 MG tablet Take 10 mg by mouth 2 times daily  1/28/2019 at pm Yes Reported, Patient   Ergocalciferol (VITAMIN D2) 2000 units TABS Take 2,000 Units by mouth daily 1/28/2019 at am Yes Unknown, Entered By History   Melatonin 10 MG TABS tablet Take 10 mg by mouth At Bedtime 1/28/2019 at hs Yes Unknown, Entered By History   Memantine HCl (NAMENDA PO) Take 10 mg by mouth 2 times daily  1/28/2019 at pm Yes Reported, Patient   metFORMIN (GLUCOPHAGE) 500 MG/5ML SOLN Take 500 mg by mouth 2 times daily 1/28/2019 at pm Yes Reported, Patient   multivitamin,  therapeutic with minerals (MULTI-VITAMIN) TABS Take 1 tablet by mouth daily 1/28/2019 at am Yes Reported, Patient   NITROGLYCERIN SL Place 0.4 mg under the tongue every 5 minutes as needed no recent usage Yes Reported, Patient   Pramipexole Dihydrochloride (MIRAPEX PO) Take 0.75 mg by mouth At Bedtime  1/28/2019 at hs Yes Reported, Patient   vitamin C (ASCORBIC ACID) 500 MG tablet Take 500 mg by mouth daily 1/28/2019 at am Yes Unknown, Entered By History

## 2019-01-29 NOTE — ED TRIAGE NOTES
Pt presents via EMS for c/o's having episodes of confusion, slurred/jumbled speed, hallucinations, weakness that all started a couple of days ago and has gotten progressively worse. Pt is confused on arrival, ABC's intact.

## 2019-01-29 NOTE — ED NOTES
Bed: ED03  Expected date: 1/29/19  Expected time: 10:33 AM  Means of arrival: Ambulance  Comments:  [facility] [phone]

## 2019-01-29 NOTE — H&P
[facility] Internal Medicine  History and Physical      Patient Name: Josafat Matson MRN# [id number]   Age: 78 year old YOB: 1940     Date of Admission:1/29/2019    Primary care provider: Georgiana Bansal  Date of Service: 1/29/2019         Assessment and Plan:   Josafat Matson is a 78 year old man with medical history significant for Parkinson's disease, Lewy body dementia, CAD (prior stents placement), chronic atrial fibrillation (not on systemic anticoagulation due to high fall risks), hypertension, Type 2 diabetes, sleep apnea (not on CPAP), and hyperlipidemia who was admitted to Observation Unit today after 2 day history of acutely worsening visual hallucination, insomnia, and behavior dyscontrol.  Preliminary work ups at the Emergency Department were grossly negative including normal leukocyte counts, negative Gram stain on urine analysis, negative acute process of head CT and Brain MRI.  However, he continues to have active visual hallucinations and worsening cognitive capacity, and his caretaker (his wife) no longer felt safe to take care of him at home environment.  Although these symptoms are ongoing and has been described in recent Neurologist visit, he may need placement if this is his new baseline.  He will likely need psychiatry's evaluation and recommendations for better control of hallucinations, insomnia, and behavior dyscontrol.  Ultimately social service assistance for safe discharge placement.    #1 Altered mental status  With grossly negative work ups, I highly suspect that this worsening symptoms are progression of his underlying illness of combined Parkinson disease and Lewy body dementia.  His long time caretaker (wife of 15 years) no longer feels confident to manage him safely at current home.    - Will place Psychiatry consult to recommend better control of hallucination; however, will trial low dose of Seroquel tonight   - Although he is in atrial fibrillation, will  get ECG to see where QTc stands after Seroquel.  - Place Social service consult for discharge planning.      #2 Parkinson disease and Lewy body dementia  - Will continue home Aricept, Sinemet, and Mirapex.    #3 Type II DM  - With stable BMP, will continue home Metformin  - Check serum glucose per unit routine.    #4 Atrial fibrillation:  - Not on rate control agents or anticoagulation at home (risk was greater than benefit  - Will consider low dose beta blocker if his heart rate was consistently greater than 100 on routine vital sign checks    #5 CAD:  - Continue home ASA and Lipitor.      CODE: Full Code, Wife explained this woujld be a time limited trial.    Diet/IVF:  General    GI ppx:  Stress ulcer prophylaxis is not indicated.    DVT ppx: SCD.    Patient discussed with Dr. Black              Chief Complaint:   Altered mental status         HPI:   Josafat Matson is a 78 year old man with medical history significant for Parkinson's disease, Lewy body dementia, CAD (prior stents placement), chronic atrial fibrillation (not on systemic anticoagulation due to high fall risks), hypertension, Type 2 diabetes, sleep apnea (not on CPAP), and hyperlipidemia who presented to the Emergency Department  today after 2 day history of acutely worsening visual hallucination, insomnia, and behavior dyscontrol.  His wife described that he had active conversations with a person who was not present in the room and snapping his fingers non-stop.  At baseline, he was able to redirected easily and to follow directions.  He could even do light exercise with his wife and ambulate with a walker.  He is normally in continence with urine and occasionally with feces as well.    ED laboratory work up grossly unrevealing with normal leukocyte counts, normal urine analysis.  Imaging head Ct and Brain MRI were negative for acute process.  Patient received 500 ml of NS IVF and admitted for further management of altered mental status.          Past Medical History:     Past Medical History:   Diagnosis Date     A-fib (H)      Alzheimer's disease      Coronary artery disease      Diabetes 1.5, managed as type 2 (H)      Heart attack (H)      Hyperlipidemia      Hypertension      Non-allergic rhinitis      Seborrheic dermatitis      Sleep apnea      Stented coronary artery           Past Surgical History:     Past Surgical History:   Procedure Laterality Date     CARDIAC SURGERY      coronary stent placement     cataract removal[       COLONOSCOPY  2003     EYE SURGERY      cataract removal, right     PHACOEMULSIFICATION CLEAR CORNEA WITH STANDARD INTRAOCULAR LENS IMPLANT  2014    Procedure: PHACOEMULSIFICATION CLEAR CORNEA WITH STANDARD INTRAOCULAR LENS IMPLANT;  LEFT PHACOEMULSIFICATION CLEAR CORNEA WITH STANDARD INTRAOCULAR LENS IMPLANT ;  Surgeon: Jovan Kam MD;  Location: [facility]          Social History:     Social History     Socioeconomic History     Marital status:      Spouse name: Not on file     Number of children: Not on file     Years of education: Not on file     Highest education level: Not on file   Social Needs     Financial resource strain: Not on file     Food insecurity - worry: Not on file     Food insecurity - inability: Not on file     Transportation needs - medical: Not on file     Transportation needs - non-medical: Not on file   Occupational History     Not on file   Tobacco Use     Smoking status: Former Smoker     Last attempt to quit: 1990     Years since quittin.0     Smokeless tobacco: Never Used   Substance and Sexual Activity     Alcohol use: Yes     Comment: 2/week     Drug use: Not on file     Sexual activity: Not on file   Other Topics Concern     Not on file   Social History Narrative     Not on file          Family History:   No family history on file.       Allergies:      Allergies   Allergen Reactions     Tamsulosin      Other reaction(s): Dizziness     Terazosin      Other reaction(s):  Dizziness          Medications:     Prior to Admission medications    Medication Sig Last Dose Taking? Auth Provider   Aspirin (ECOTRIN PO) Take 325 mg by mouth daily   Reported, Patient   Atorvastatin Calcium (LIPITOR PO) Take 80 mg by mouth daily   Reported, Patient   carbidopa-levodopa (SINEMET)  MG per tablet Take 2 tablets by mouth   Reported, Patient   CYANOCOBALAMIN PO Take 1,000 mcg by mouth daily   Reported, Patient   donepezil (ARICEPT) 10 MG tablet Take 10 mg by mouth   Reported, Patient   Memantine HCl (NAMENDA PO) Take 10 mg by mouth daily   Reported, Patient   metFORMIN (GLUCOPHAGE) 500 MG/5ML SOLN Take 500 mg by mouth 2 times daily   Reported, Patient   modafinil (PROVIGIL) 200 MG tablet Take 100 mg by mouth   Reported, Patient   multivitamin, therapeutic with minerals (MULTI-VITAMIN) TABS Take 1 tablet by mouth daily   Reported, Patient   NITROGLYCERIN SL Place 0.4 mg under the tongue every 5 minutes as needed   Reported, Patient   Pramipexole Dihydrochloride (MIRAPEX PO) Take 0.5-0.75 mg by mouth daily 2-3 hours before bedtime   Reported, Patient   Pramipexole Dihydrochloride (MIRAPEX PO) Take 0.5 mg by mouth daily At bedtime   Reported, Patient   Pyridoxine HCl (VITAMIN B6 PO) Take 100 mg by mouth daily   Reported, Patient          Review of Systems:   A complete ROS was performed and is negative other than what is stated in the HPI.       Physical Exam:   Blood pressure 138/70, pulse 71, temperature 98.5  F (36.9  C), temperature source Oral, resp. rate 20, weight 79.4 kg (175 lb), SpO2 99 %.  General: Chronically ill appearing man lying in bed.  Minimal verbal interaction.  Actively converse with someone not present.  Not able to follow commands consistently.  HEENT: AT/NC, sclera anicteric, PERRL, EOMI, OP clear with MMM  Neck: Supple, no JVD or cervical LAD  Chest/Resp: clear to auscultation bilaterally, no crackles or wheezes  Heart/CV: regular rate and rhythm, no murmur  Abdomen/GI:  Soft, nontender, nondistended. +BS.  No HSM or masses, no rebound or guarding.  Extremities/MSK: No LE edema  Skin: Warm and dry, no jaundice or rash             Labs:   ROUTINE ICU LABS (Last four results)  CMP  Recent Labs   Lab 01/29/19  1051      POTASSIUM 4.1   CHLORIDE 104   CO2 28   ANIONGAP 8   *   BUN 16   CR 0.96   GFRESTIMATED 76   GFRESTBLACK 88   CAMILLA 8.8     CBC  Recent Labs   Lab 01/29/19  1051   WBC 4.6   RBC 4.57   HGB 13.5   HCT 40.7   MCV 89   MCH 29.5   MCHC 33.2   RDW 13.3        INRNo lab results found in last 7 days.  Arterial Blood GasNo lab results found in last 7 days.       Imaging/Procedures:     Results for orders placed or performed during the hospital encounter of 01/29/19   CT Head w/o Contrast    Narrative    CT SCAN OF THE HEAD WITHOUT CONTRAST January 29, 2019 11:02 AM     HISTORY: Neuro deficit(s), subacute.    TECHNIQUE: Axial images of the head and coronal reformations without  IV contrast material. Radiation dose for this scan was reduced using  automated exposure control, adjustment of the mA and/or kV according  to patient size, or iterative reconstruction technique.    COMPARISON: 10/22/2018.    FINDINGS: There is no evidence of intracranial hemorrhage, mass, acute  infarct or anomaly. The ventricles are normal in size, shape and  configuration. Mild diffuse parenchymal volume loss. Mild patchy  periventricular white matter hypodensities which are nonspecific, but  likely related to chronic microvascular ischemic disease.     The visualized portions of the sinuses and mastoids appear normal. The  bony calvarium and bones of the skull base appear intact.       Impression    IMPRESSION:     1. No evidence of acute intracranial hemorrhage, mass, or herniation.  2. Mild diffuse parenchymal volume loss and white matter changes  likely due to chronic microvascular ischemic disease. No change since  prior.    JEANA JACOME MD   MR Brain w/o & w Contrast     Narrative    MRI BRAIN WITHOUT AND WITH CONTRAST  1/29/2019 12:48 PM     HISTORY: Ataxia.    TECHNIQUE: Multiplanar, multisequence MRI of the brain without and  with 7.5 mL Gadavist.     COMPARISON: Head CT from earlier the same day.     FINDINGS: Images are degraded by motion artifact. There is no evidence  of acute infarct, hemorrhage, mass, or herniation. Mild diffuse  parenchymal volume loss. Mild patchy deep and subcortical white matter  T2 hyperintensities which are nonspecific, but likely related to  chronic microvascular ischemic disease. Ventricular size within normal  limits without hydrocephalus.     There is no abnormal intracranial enhancement.     The facial structures appear normal. The major arterial T2 flow voids  at the base of the brain appear patent.       Impression    IMPRESSION:    1. No evidence of acute infarct, mass, hemorrhage, or herniation.  2. Mild diffuse parenchymal volume loss and white matter changes  likely due to chronic microvascular ischemic disease.      JEANA JACOME MD

## 2019-01-29 NOTE — ED NOTES
[facility]  ED Nurse Handoff Report    Josafat Matson is a 78 year old male   ED Chief complaint: Altered Mental Status  . ED Diagnosis:   Final diagnoses:   Altered mental status, unspecified altered mental status type     Allergies:   Allergies   Allergen Reactions     Tamsulosin      Other reaction(s): Dizziness     Terazosin      Other reaction(s): Dizziness       Code Status: Full Code  Activity level - Baseline/Home:  Independent. Activity Level - Current:   Total Care. Lift room needed: No. Bariatric: No   Needed: No   Isolation: No. Infection: Not Applicable.     Vital Signs:   Vitals:    01/29/19 1315 01/29/19 1330 01/29/19 1345 01/29/19 1400   BP: 127/72 129/77 150/79 126/67   Pulse: 74 78 76 73   Resp:       Temp:       TempSrc:       SpO2:    99%   Weight:           Cardiac Rhythm:  ,      Pain level:    Patient confused: yes Patient Falls Risk: Yes.   Elimination Status: incontinent   Patient Report - Initial Complaint: AMS. Focused Assessment:  Cognitive - Cognitive/Neuro/Behavioral WDL: level of consciousness; all Level Of Consciousness: confused; lethargic Speech: garbled; fluent; rambling Mood/Behavior: cooperative; calm  Gisel Coma Scale - Best Eye Response: 4-->(E4) spontaneous Best Motor Response: 6-->(M6) obeys commands Best Verbal Response: 4-->(V4) confused [place] Coma Scale Score: 14  Tests Performed: labs/imaging. Abnormal Results:   Labs Ordered and Resulted from Time of ED Arrival Up to the Time of Departure from the ED   BASIC METABOLIC PANEL - Abnormal; Notable for the following components:       Result Value    Glucose 179 (*)     All other components within normal limits   ROUTINE UA WITH MICROSCOPIC - Abnormal; Notable for the following components:    Ketones Urine 5 (*)     Mucous Urine Present (*)     All other components within normal limits   CBC WITH PLATELETS DIFFERENTIAL   TROPONIN I     .   Treatments provided: observation  Family Comments: wife  present and supportive  OBS brochure/video discussed/provided to patient:  N/A  ED Medications:   Medications   0.9% sodium chloride BOLUS (500 mLs Intravenous New Bag 1/29/19 1209)     Followed by   sodium chloride 0.9% infusion (not administered)   gadobutrol (GADAVIST) injection 7.5 mL (7.5 mLs Intravenous Given 1/29/19 1247)     Drips infusing:  Yes  For the majority of the shift, the patient's behavior Green. Interventions performed were .     Severe Sepsis OR Septic Shock Diagnosis Present: No  RECEIVING UNIT ED HANDOFF REVIEW    Above ED Nurse Handoff Report was reviewed: YES  Reviewed by: Whit Armstrong on January 29, 2019 at 2:57 PM       ED Nurse Name/Phone Number: Nayeli Rojasmalcom,   2:07 PM

## 2019-01-30 NOTE — PLAN OF CARE
Patient hospitalized for AMS, alert to self, Ax2, VSS, denies pain. Patient still confused and hallucinating, started with sitter, now under VPM. Reg diet, feed  with assist, incontinent with B/B, LBM 1/29/19. IV SL. SW consulted. Disposition TBD.

## 2019-01-30 NOTE — PROVIDER NOTIFICATION
Md notified FYI: Pt has history of A.fib - apical pulse irregular and HR bouncing back and forth between 40's-70's. Do we want an order for tele?    Update: Patient placed on tele - current reading SR with 1st degree AV block. Frequent PAC's and PVC's.

## 2019-01-30 NOTE — PLAN OF CARE
PRIMARY DIAGNOSIS: AMS, Hallucinations    OUTPATIENT/OBSERVATION GOALS TO BE MET BEFORE DISCHARGE  1. Orthostatic performed: N/A    2. Tolerating PO medications: Yes    3. Return to near baseline physical activity: No    4. Cleared for discharge by consultants (if involved): N/A    Discharge Planner Nurse   Safe discharge environment identified: Yes  Barriers to discharge: No       Entered by: Marie Pete 01/30/2019 3:14 PM     Please review provider order for any additional goals.   Nurse to notify provider when observation goals have been met and patient is ready for discharge.    VSS except BP soft, 91/42, recheck was 130/58.  Alert, oriented to self and place. Pt on tele- SR with 1st DAVB.  Incontinent of bowel and bladder. Psych consulted, continue seroquel 25mg.  SW following.  Pt likely to discharge tomorrow to home with support of family.  Pt up in chair for breakfast and lunch.  Assist x2 w/gait belt.

## 2019-01-30 NOTE — PLAN OF CARE
Pt admitted for AMS - oriented to self only  No hallucinations or behaviors present   Hx lewy body dementia - VPM in place  Patient slept sound the entire night   X1 large incontinent bowel movement  Denies pain or discomfort - x2 assist  Hypotension resolved after 1L fluid bolus  Bradycardia and irregular HR noted - pt placed on tele  Monitor reading SR with 1st degree AV block, frequent PAC/PVC's  Psyc and SW consult - pending placement  Comes from home with wife as caregiver

## 2019-01-30 NOTE — PLAN OF CARE
PRIMARY DIAGNOSIS: AMS, Hallucinations  OUTPATIENT/OBSERVATION GOALS TO BE MET BEFORE DISCHARGE:  ADLs back to baseline: No    Activity and level of assistance: Up with maximum assistance. Consider SW and/or PT evaluation.     Pain status: Pain free.    Return to near baseline physical activity: No     Discharge Planner Nurse   Safe discharge environment identified: Yes  Barriers to discharge: No       Entered by: Marie Pete 01/30/2019 3:01 PM     Please review provider order for any additional goals.   Nurse to notify provider when observation goals have been met and patient is ready for discharge.    VSS, alert, disoriented to time and situation. Assist x2 with gait belt.  Tele-SR with 1st degree AVB.  LS-im. Incontinent of bowel and bladder. Pt started on seroquel this admission. Psych consulted. SW following.

## 2019-01-30 NOTE — PROGRESS NOTES
[facility]  Hospitalist Progress Note  Massimo Carter MD 01/30/19    Reason for Stay (Diagnosis): agitation         Assessment and Plan:      Summary of Stay: Josafat Matson is a 78 year old male with past medical history including Parkinson's, Lewy body dementia, coronary artery disease with stents, chronic A. fib not on anticoagulation due to fall risk, type 2 diabetes and sleep apnea admitted on 1/29/2019 with visual hallucinations, agitation and insomnia with behavioral disturbance.  He underwent fairly extensive workup in the ER including labs, urine sample, CT head and brain MRI which did not reveal acute pathology.  It seems most likely that his acute issues were related to agitation and delirium in the setting of progressive dementia.  He was admitted for further care did remarkably well with addition of 25 mg Seroquel.  Risks and benefits of this medication were discussed with his wife.  Psychiatry also did see him and agreed with continuing Seroquel.  At this point it appears the patient will eventually discharged home after a short period of ongoing monitoring here in the hospital.    Problem List/Assessment and Plan:   1. Agitation with visual hallucinations: As above.  Much improved.  Suspect related to his dementia.  --Continue 25 mg Seroquel nightly  --Redirect as needed though today is calm and appropriate.  --No acute organic pathology identified on workup.    2 Parkinson disease and Lewy body dementia  - Will continue home Aricept, Sinemet, and Mirapex.     3 Type II DM  - With stable BMP, will continue home Metformin  -Routine glucose checks.     4 Atrial fibrillation:  - Not on rate control agents or anticoagulation at home (risk was greater than benefit  - consider low dose beta blocker if his heart rate was consistently greater than 100 on routine vital sign checks     5 CAD:  - Continue home ASA and Lipitor.        CODE: Full Code, Wife explained this woujld be a  time limited trial.  She was given an advanced directive form to continue to work on and reconsider status.  DVT ppx: SCD.  Disposition: His wife feels as though she will be able to take him home tomorrow.          Interval History (Subjective):        Agitation and hallucinations markedly better but still reaching periodically   patient admitted yesterday, I assumed care today  I reviewed his history at length with his wife in person  The patient denies any physical complaints at this point                  Physical Exam:      Last Vital Signs:  BP 91/42 (BP Location: Left arm)   Pulse 83   Temp 95.1  F (35.1  C) (Axillary)   Resp 16   Wt 79.4 kg (175 lb)   SpO2 99%   BMI 23.09 kg/m        Intake/Output Summary (Last 24 hours) at 1/30/2019 1256  Last data filed at 1/30/2019 1235  Gross per 24 hour   Intake 1880 ml   Output --   Net 1880 ml       General: Alert, awake, no acute distress.  HEENT: NC/AT, eyes anicteric, external occular movements intact, face symmetric.  Dentition WNL, MM moist.  Cardiac: RRR, S1, S2.  No murmurs appreciated.  Pulmonary: Normal chest rise, normal work of breathing.  Lungs CTA BL  Abdomen: soft, non-tender, non-distended.  Bowel Sounds Present.  No guarding.  Extremities: no deformities.  Warm, well perfused.  Skin: no rashes or lesions noted.  Warm and Dry.  Neuro: No focal deficits noted.  Speech clear.  Coordination and strength grossly normal.  Psych: Appropriate affect.         Medications:      All current medications were reviewed with changes reflected in problem list.         Data:      All new lab and imaging data was reviewed.   Labs:  Recent Labs   Lab 01/30/19  0717      POTASSIUM 3.9   CHLORIDE 107   CO2 30   ANIONGAP 3   *   BUN 14   CR 0.91   GFRESTIMATED 80   GFRESTBLACK >90   CAMILLA 8.0*     Recent Labs   Lab 01/29/19  1051   WBC 4.6   HGB 13.5   HCT 40.7   MCV 89         Imaging:   Recent Results (from the past 48 hour(s))   CT Head w/o  Contrast    Narrative    CT SCAN OF THE HEAD WITHOUT CONTRAST January 29, 2019 11:02 AM     HISTORY: Neuro deficit(s), subacute.    TECHNIQUE: Axial images of the head and coronal reformations without  IV contrast material. Radiation dose for this scan was reduced using  automated exposure control, adjustment of the mA and/or kV according  to patient size, or iterative reconstruction technique.    COMPARISON: 10/22/2018.    FINDINGS: There is no evidence of intracranial hemorrhage, mass, acute  infarct or anomaly. The ventricles are normal in size, shape and  configuration. Mild diffuse parenchymal volume loss. Mild patchy  periventricular white matter hypodensities which are nonspecific, but  likely related to chronic microvascular ischemic disease.     The visualized portions of the sinuses and mastoids appear normal. The  bony calvarium and bones of the skull base appear intact.       Impression    IMPRESSION:     1. No evidence of acute intracranial hemorrhage, mass, or herniation.  2. Mild diffuse parenchymal volume loss and white matter changes  likely due to chronic microvascular ischemic disease. No change since  prior.    JEANA JACOME MD   MR Brain w/o & w Contrast    Narrative    MRI BRAIN WITHOUT AND WITH CONTRAST  1/29/2019 12:48 PM     HISTORY: Ataxia.    TECHNIQUE: Multiplanar, multisequence MRI of the brain without and  with 7.5 mL Gadavist.     COMPARISON: Head CT from earlier the same day.     FINDINGS: Images are degraded by motion artifact. There is no evidence  of acute infarct, hemorrhage, mass, or herniation. Mild diffuse  parenchymal volume loss. Mild patchy deep and subcortical white matter  T2 hyperintensities which are nonspecific, but likely related to  chronic microvascular ischemic disease. Ventricular size within normal  limits without hydrocephalus.     There is no abnormal intracranial enhancement.     The facial structures appear normal. The major arterial T2 flow voids  at the base  of the brain appear patent.       Impression    IMPRESSION:    1. No evidence of acute infarct, mass, hemorrhage, or herniation.  2. Mild diffuse parenchymal volume loss and white matter changes  likely due to chronic microvascular ischemic disease.      MD Massimo BLUE MD.

## 2019-01-30 NOTE — PROVIDER NOTIFICATION
Md notified: Pt admitted for AMS. Lethargic. Arouses to voice. BP 87/41, HR 40's. x2 inc foul smelling loose stools in past 3 hours.     Update: 1000ml NS bolus administered. BP recheck stable.

## 2019-01-30 NOTE — CONSULTS
Care Transition Initial Assessment - SW  Reason For Consult: discharge planning  Met with: Patient and Family- wife and son present.     Active Problems:    Change in mental state       DATA  Lives With: spouse, child(abel), adult   PT living in the lower level of his son's home. Needs to ambulate 9 steps to get Up/down in the home   Description of Support System: Supportive, Involved  Who is your support system?: Wife, Children- Pt's son/DIL work full time.   Support Assessment: Adequate family and caregiver support, Adequate social supports. Family have not outside support. They have been able to manage up till yesterday when pt had a fall and wife could not get pt up off the floor.   Identified issues/concerns regarding health management: PT admitted from home due to fall/weakness and Hallucinations.        ASSESSMENT  Cognitive Status:  awake and alert at times.  But has confusion due to his Lewy body dementia and Parkinson  dementia   Concerns to be addressed: Met with pt,. Spouse and his son. Pt and his wife moved in family because they were not able to afford living on their own.. At this time pt has no outside support. Wife spent time today about how they may need to file for Bankruptcy . Prior to admission pts wife had been looking at [facility] facility support but financially they are not able to afford this.  Spoke with family about doing an EW assessment for support.  SW did encourage that wife consider obtaining a transfer belt as she walks with pt up/down stairs and holds his T-shirt.  Pt does have HCD that may need to be updated. Spoke with family about options and provided now form to look at and fill out.  .  SW encouraged family to consider Adult day care for time off from spouse due to caregiver burnout.  Discussed stair lift, son stated that stairwell is not wide enough to support this.   At baseline the only DME pt is using is his shower chair..    PLAN  PT is OBS care, there is no way family can pay  for TCU  Plan would be for pt to d.c to home  SW will call [facility] to request EW assessments.   Unsure if pt would need home PT eval

## 2019-01-31 NOTE — PLAN OF CARE
PRIMARY DIAGNOSIS: AMS,  Hallucinations  OUTPATIENT/OBSERVATION GOALS TO BE MET BEFORE DISCHARGE:  ADLs back to baseline: No    Activity and level of assistance: Up with maximum assistance. Consider SW and/or PT evaluation.     Pain status: Pain free.    Return to near baseline physical activity: No     Discharge Planner Nurse   Safe discharge environment identified: No  Barriers to discharge: No       Entered by: Marie Pete 01/31/2019 3:07 PM     Please review provider order for any additional goals.   Nurse to notify provider when observation goals have been met and patient is ready for discharge.    VSS.Alert, oriented this morning. Assist x2 with gait belt, PT consulted.  Possible discharge pending PT recommendations.  Insulin given per sliding scale.

## 2019-01-31 NOTE — PROGRESS NOTES
01/31/19 1200   Quick Adds   Type of Visit Initial PT Evaluation   Living Environment   Lives With child(abel), adult;spouse   Living Arrangements house   Home Accessibility stairs to enter home;stairs within home   Living Environment Comment Spouse, Mauri has been providing SBA for all mobility and stairs at home. She reports there are ~9 stairs with bilateral rail to get down to the basement where they stay.    Self-Care   Usual Activity Tolerance fair  (Mostly a household ambulator recently)   Equipment Currently Used at Home shower chair;cane, straight   Functional Level Prior   Ambulation 2-->assistive person   Transferring 2-->assistive person   Fall history within last six months yes   Number of times patient has fallen within last six months 2   General Information   Onset of Illness/Injury or Date of Surgery - Date 01/29/19   Referring Physician Emma   Patient/Family Goals Statement Return home   Pertinent History of Current Problem (include personal factors and/or comorbidities that impact the POC) 78 year old male admitted with agitation and visial hallucinations.    Precautions/Limitations fall precautions   Cognitive Status Examination   Orientation person;place   Level of Consciousness alert   Follows Commands and Answers Questions able to follow single-step instructions  (With tactile cues/assist. Difficulty with motor commands)   Pain Assessment   Patient Currently in Pain No   Posture    Posture Forward head position   Strength   Strength Comments Sufficient for mobility with CGA/min A.    Transfer Skills   Transfer Comments Sit to/from stand x 3 reps with CGA.    Gait   Gait Comments Ambulates 5' with no AD CGA, HHA.    Balance   Balance Comments Requires assist for safety with dynamic physical mobility.    Coordination   Coordination Comments Needs tactile assist for safety with mobility. Difficulty with motor commands.    General Therapy Interventions   Planned Therapy Interventions  "gait training   Clinical Impression   Criteria for Skilled Therapeutic Intervention yes, treatment indicated   PT Diagnosis Impaired gait   Influenced by the following impairments Impaired balance, decreased activity tolerance, decreased strength   Functional limitations due to impairments Decreased IND with amb, stairs   Clinical Presentation Stable/Uncomplicated   Clinical Presentation Rationale Stable medically.    Clinical Decision Making (Complexity) Low complexity   Therapy Frequency` other (see comments)  (One treat)   Predicted Duration of Therapy Intervention (days/wks) 1 treat   Anticipated Discharge Disposition Home with Assist;Home with Outpatient Therapy   Risk & Benefits of therapy have been explained Yes   Patient, Family & other staff in agreement with plan of care Yes   [facility] [employer] TM \"6 Clicks\"   2016, Trustees of [facility], under license to [employer].  All rights reserved.   6 Clicks Short Forms Basic Mobility Inpatient Short Form   [facility]  \"6 Clicks\" V.2 Basic Mobility Inpatient Short Form   1. Turning from your back to your side while in a flat bed without using bedrails? 4 - None   2. Moving from lying on your back to sitting on the side of a flat bed without using bedrails? 4 - None   3. Moving to and from a bed to a chair (including a wheelchair)? 3 - A Little   4. Standing up from a chair using your arms (e.g., wheelchair, or bedside chair)? 3 - A Little   5. To walk in hospital room? 3 - A Little   6. Climbing 3-5 steps with a railing? 3 - A Little   Basic Mobility Raw Score (Score out of 24.Lower scores equate to lower levels of function) 20   Total Evaluation Time   Total Evaluation Time (Minutes) 10     "

## 2019-01-31 NOTE — PLAN OF CARE
Pt discharged home with wife as transport.  Discharge instructions reviewed with pt and spouse.  Spouse verbalized understanding and all questions were answered.  Pt discharged with all personal belongings and discharge medication (seroquel).  Pt taken off unit via wheelchair.     OBSERVATION patient END time: 6461

## 2019-01-31 NOTE — DISCHARGE SUMMARY
[facility]  Discharge Summary  Name: Josafat Matson    MRN: [id number]  YOB: 1940    Age: 78 year old  Date of Discharge:  1/31/2019  Date of Admission: 1/29/2019  Primary Care Provider: Georgiana Bansal  Discharge Physician:  Freeman Carter MD  Discharging Service:  Hospitalist      Hospital Course/Discharge Diagnoses:  Josafat Matson is a 78 year old male with past medical history including Parkinson's, Lewy body dementia, coronary artery disease with stents, chronic A. fib not on anticoagulation due to fall risk, type 2 diabetes and sleep apnea admitted on 1/29/2019 with visual hallucinations, agitation and insomnia with behavioral disturbance.  He underwent fairly extensive workup in the ER including labs, urine sample, CT head and brain MRI which did not reveal acute pathology.  It seems most likely that his acute issues were related to agitation and delirium in the setting of progressive dementia.  He was admitted for further care did remarkably well with addition of 25 mg Seroquel.  Risks and benefits of this medication were discussed with his wife.  Psychiatry also did see him and agreed with continuing Seroquel.      He did well again overnight and was reasonably mobile with physical therapy today.  I again met with his wife and she is been working on getting additional family support at home.  I offered home care referral although it sounds like she is considering outpatient therapy more specialized and Parkinson's.      1. Agitation with visual hallucinations: As above.    Resolved.  Suspect delirium superimposed upon his dementia.  --Continue 25 mg Seroquel nightly, he has done remarkably well on this medication.  --No acute organic pathology identified on workup.     2 Parkinson disease and Lewy body dementia  - Will continue home Aricept, Sinemet, and Mirapex.     3 Type II DM: continue home Metformin     4 Atrial fibrillation:  - Not on rate control agents or  anticoagulation at home (risk was greater than benefit     5 CAD:  - Continue home ASA and Lipitor.     Advance care planning: This point he has been full code but his wife plans to address this further and she was given an advanced directive form to review and discuss further with family.    Discharge Disposition:  Discharged to home     Allergies:  Allergies   Allergen Reactions     Tamsulosin      Other reaction(s): Dizziness     Terazosin      Other reaction(s): Dizziness        Discharge Medications:        Review of your medicines      START taking      Dose / Directions   QUEtiapine 25 MG tablet  Commonly known as:  SEROquel  Used for:  Agitation      Dose:  25 mg  Take 1 tablet (25 mg) by mouth At Bedtime  Quantity:  30 tablet  Refills:  0        CONTINUE these medicines which have NOT CHANGED      Dose / Directions   aspirin 81 MG EC tablet  Commonly known as:  ASA      Dose:  81 mg  Take 81 mg by mouth daily  Refills:  0     carbidopa-levodopa  MG tablet  Commonly known as:  SINEMET      Dose:  2 tablet  Take 2 tablets by mouth 3 times daily  Refills:  0     donepezil 10 MG tablet  Commonly known as:  ARICEPT      Dose:  10 mg  Take 10 mg by mouth 2 times daily  Refills:  0     LIPITOR PO      Dose:  80 mg  Take 80 mg by mouth daily  Refills:  0     Melatonin 10 MG Tabs tablet      Dose:  10 mg  Take 10 mg by mouth At Bedtime  Refills:  0     metFORMIN 500 MG/5ML Soln solution  Commonly known as:  GLUCOPHAGE      Dose:  500 mg  Take 500 mg by mouth 2 times daily  Refills:  0     MIRAPEX PO      Dose:  0.75 mg  Take 0.75 mg by mouth At Bedtime  Refills:  0     Multi-vitamin tablet      Dose:  1 tablet  Take 1 tablet by mouth daily  Refills:  0     NAMENDA PO      Dose:  10 mg  Take 10 mg by mouth 2 times daily  Refills:  0     NITROGLYCERIN SL      Dose:  0.4 mg  Place 0.4 mg under the tongue every 5 minutes as needed  Refills:  0     vitamin C 500 MG tablet  Commonly known as:  ASCORBIC ACID       "Dose:  500 mg  Take 500 mg by mouth daily  Refills:  0     Vitamin D2 2000 units Tabs      Dose:  2000 Units  Take 2,000 Units by mouth daily  Refills:  0           Where to get your medicines      These medications were sent to [place], [place] - [address] [facility]  [address] [facility] [address]    Phone:  [phone]     QUEtiapine 25 MG tablet         Condition on Discharge:  Discharge condition: Stable       Code status on discharge: Full Code     History of Illness:  See detailed admission note for full details.    Physical Exam:  Vital signs:  Temp: 95.4  F (35.2  C) Temp src: Axillary BP: 138/62 Pulse: 67 Heart Rate: 59 Resp: 16 SpO2: 97 % O2 Device: None (Room air)   Height: 185.4 cm (6' 1\") Weight: 79.4 kg (175 lb)  Estimated body mass index is 23.09 kg/m  as calculated from the following:    Height as of this encounter: 1.854 m (6' 1\").    Weight as of this encounter: 79.4 kg (175 lb).    Wt Readings from Last 1 Encounters:   01/29/19 79.4 kg (175 lb)     General: Alert, awake, no acute distress.  Pleasant elderly man.  HEENT: NC/AT, eyes anicteric  Cardiac: RRR, S1, S2.    Pulmonary: Normal chest rise, normal work of breathing.  Lungs CTA BL  Abdomen: soft, non-tender, non-distended.  Bowel Sounds Present.  No guarding.  Extremities: no deformities.  Warm, well perfused.  Skin: no rashes or lesions noted.  Warm and Dry.  Neuro: No focal deficits noted.  Speech clear.  He is oriented to location and recent events.  No tremor or clonus.  Psych: Stoic affect.    Procedures other than Imaging:  None     Imaging:  Results for orders placed or performed during the hospital encounter of 01/29/19   CT Head w/o Contrast    Narrative    CT SCAN OF THE HEAD WITHOUT CONTRAST January 29, 2019 11:02 AM     HISTORY: Neuro deficit(s), subacute.    TECHNIQUE: Axial images of the head and coronal reformations without  IV contrast material. Radiation dose for this scan was " reduced using  automated exposure control, adjustment of the mA and/or kV according  to patient size, or iterative reconstruction technique.    COMPARISON: 10/22/2018.    FINDINGS: There is no evidence of intracranial hemorrhage, mass, acute  infarct or anomaly. The ventricles are normal in size, shape and  configuration. Mild diffuse parenchymal volume loss. Mild patchy  periventricular white matter hypodensities which are nonspecific, but  likely related to chronic microvascular ischemic disease.     The visualized portions of the sinuses and mastoids appear normal. The  bony calvarium and bones of the skull base appear intact.       Impression    IMPRESSION:     1. No evidence of acute intracranial hemorrhage, mass, or herniation.  2. Mild diffuse parenchymal volume loss and white matter changes  likely due to chronic microvascular ischemic disease. No change since  prior.    JEANA JACOME MD   MR Brain w/o & w Contrast    Narrative    MRI BRAIN WITHOUT AND WITH CONTRAST  1/29/2019 12:48 PM     HISTORY: Ataxia.    TECHNIQUE: Multiplanar, multisequence MRI of the brain without and  with 7.5 mL Gadavist.     COMPARISON: Head CT from earlier the same day.     FINDINGS: Images are degraded by motion artifact. There is no evidence  of acute infarct, hemorrhage, mass, or herniation. Mild diffuse  parenchymal volume loss. Mild patchy deep and subcortical white matter  T2 hyperintensities which are nonspecific, but likely related to  chronic microvascular ischemic disease. Ventricular size within normal  limits without hydrocephalus.     There is no abnormal intracranial enhancement.     The facial structures appear normal. The major arterial T2 flow voids  at the base of the brain appear patent.       Impression    IMPRESSION:    1. No evidence of acute infarct, mass, hemorrhage, or herniation.  2. Mild diffuse parenchymal volume loss and white matter changes  likely due to chronic microvascular ischemic  disease.      JEANA JACOME MD        Consultations:  Consultation during this admission received from physical therapy.       Recent Lab Results:  Recent Labs   Lab 01/29/19  1051   WBC 4.6   HGB 13.5   HCT 40.7   MCV 89             Lab Results   Component Value Date     01/30/2019     01/29/2019     10/22/2018    Lab Results   Component Value Date    CHLORIDE 107 01/30/2019    CHLORIDE 104 01/29/2019    CHLORIDE 103 10/22/2018    Lab Results   Component Value Date    BUN 14 01/30/2019    BUN 16 01/29/2019    BUN 15 10/22/2018      Lab Results   Component Value Date    POTASSIUM 3.9 01/30/2019    POTASSIUM 4.1 01/29/2019    POTASSIUM 4.2 10/22/2018    Lab Results   Component Value Date    CO2 30 01/30/2019    CO2 28 01/29/2019    CO2 31 10/22/2018    Lab Results   Component Value Date    CR 0.91 01/30/2019    CR 0.96 01/29/2019    CR 1.00 10/22/2018             Pending Results:    Unresulted Labs Ordered in the Past 30 Days of this Admission     No orders found from 11/30/2018 to 1/30/2019.           Discharge Instructions and Follow-Up:   Discharge Procedure Orders   Home care nursing referral   Referral Type: Home Health Therapies & Aides   Number of Visits Requested: 1     Home Care PT Referral for Hospital Discharge   Referral Type: Home Health Therapies & Aides   Number of Visits Requested: 1     Home Care OT Referral for Hospital Discharge   Number of Visits Requested: 1     Reason for your hospital stay   Order Comments: Kashmir was admitted for agitation and hallucinations.  He underwent an extensive workup including labs and CT and MRI of his brain.  No cause was found other than that this is felt likely related to his dementia/Parkinson's.  He was seen by our psychiatry consult service and started on 25 mg oral Seroquel at night and his hallucinations have resolved.     Follow-up and recommended labs and tests    Order Comments: Follow up with primary care provider, MARC CASTELAN,  within 7 days for hospital follow- up.     Activity   Order Comments: Your activity upon discharge: activity as tolerated with assistance from family     Order Specific Question Answer Comments   Is discharge order? Yes      MD face to face encounter   Order Comments: Documentation of Face to Face and Certification for Home Health Services    I certify that patient: Josafat Matson is under my care and that I, or a nurse practitioner or physician's assistant working with me, had a face-to-face encounter that meets the physician face-to-face encounter requirements with this patient on: 1/31/2019.    This encounter with the patient was in whole, or in part, for the following medical condition, which is the primary reason for home health care: Dementia with generalized weakness.    I certify that, based on my findings, the following services are medically necessary home health services: Nursing, Occupational Therapy and Physical Therapy.    My clinical findings support the need for the above services because: Nurse is needed: To assess home safety after changes in medications or other medical regimen.., Occupational Therapy Services are needed to assess and treat cognitive ability and address ADL safety due to impairment in cognition and mobility. and Physical Therapy Services are needed to assess and treat the following functional impairments: Mobility.    Further, I certify that my clinical findings support that this patient is homebound (i.e. absences from home require considerable and taxing effort and are for medical reasons or [employer] services or infrequently or of short duration when for other reasons) because: Leaving home is medically contraindicated for the following reason(s): Dementia would leave him prone to getting lost..    Based on the above findings. I certify that this patient is confined to the home and needs intermittent skilled nursing care, physical therapy and/or speech therapy.  The patient  is under my care, and I have initiated the establishment of the plan of care.  This patient will be followed by a physician who will periodically review the plan of care.  Physician/Provider to provide follow up care: Georgiana Bansal    Attending hospital physician (the Medicare [facility]): Massimo Carter  Physician Signature: See electronic signature associated with these discharge orders.  Date: 1/31/2019     Diet   Order Comments: Follow this diet upon discharge: Orders Placed This Encounter      Regular Diet Adult     Order Specific Question Answer Comments   Is discharge order? Yes        Total time spent in face to face contact with the patient and coordinating discharge was:  40 Minutes.

## 2019-01-31 NOTE — PROGRESS NOTES
Pt disoriented to time, situation & place. VSS. Denies pain.  On tele: SB (50's).   Calm & cooperative, noted to be sleeping most shift..  LS dim, productive cough noted.  BS active, passing gas. No BM.  Incontinent of urine.  Blanchable redness to coccyx, barrier applied, repo Q 2hrs.  Up w/2 assist, walker, gb. Alarms on.

## 2019-01-31 NOTE — PLAN OF CARE
PRIMARY DIAGNOSIS: AMS, Hallucinations  OUTPATIENT/OBSERVATION GOALS TO BE MET BEFORE DISCHARGE:  ADLs back to baseline: No    Activity and level of assistance: Up with maximum assistance. Consider SW and/or PT evaluation.     Pain status: Pain free.    Return to near baseline physical activity: No     Discharge Planner Nurse   Safe discharge environment identified: Yes  Barriers to discharge: No       Entered by: Marie Pete 01/30/2019 7:36 PM     Please review provider order for any additional goals.   Nurse to notify provider when observation goals have been met and patient is ready for discharge.    MD paged for BG orders, pt checks AC at home. Orders added and pt covered per sliding scale.  Assist x2. Psych saw pt today, continue seroquel.  Discharge tomorrow.

## 2019-01-31 NOTE — PLAN OF CARE
PT: Orders received, evaluation completed and treatment completed. Pt currently admitted under OBS status. 78 year old male admitted with agitation and hallucinations. PMH significant for parkinsons and dementia. Pt lives in a home with his adult children and spouse. Spouse and pt reside in the lower level (~9 stairs with bilateral rail) to enter. Spouse has been providing SBA with all mobility for fall prevention. Pt has a cane and a shower chair.     Discharge Planner PT   Patient plan for discharge: Per spouse, home with assist needed and OP therapy  Current status: Sit to/from stand with CGA from chair, 3 reps performed. Pt slow to stand and obtain balance but able to perform with no external A. Ambulates 150' with HHA, straight cane and 4WW. Pt demonstrating balance checks with straight cane, requiring CGA. Pt needs close SBA with 4WW and intermittent cueing for safe turns. Pt performs 12 stairs with min A; legs are steady but heavy cues are needed for motor commands/sequencing. Wife reports this can be typical.   Barriers to return to prior living situation: None with assistive device use and physical assist with all mobility   Recommendations for discharge: Home with physical assist for all mobility, 4WW use and OP PT. Wife reports she is able to provide this level of A. She also reports she would prefer they return to [facility] for continued OP PT rehab.   Rationale for recommendations: Pt demonstrating mobility for sufficient discharge to home. Will require increased A; however wife states she is able to provide. She is present and participates in the session. Pt will need continued OP PT to address balance and strength deficits.        Entered by: Kiya Jaramillo 01/31/2019 12:56 PM       Physical Therapy Discharge Summary    Reason for therapy discharge:    All goals and outcomes met, no further needs identified.    Progress towards therapy goal(s). See goals on Care Plan in Epic  electronic health record for goal details.  Goals met    Therapy recommendation(s):    See recommendations above.

## 2019-01-31 NOTE — PROGRESS NOTES
PRIMARY DIAGNOSIS: AMS  OUTPATIENT/OBSERVATION GOALS TO BE MET BEFORE DISCHARGE:  1. ADLs back to baseline: No    2. Activity and level of assistance: Up with maximum assistance. Consider SW and/or PT evaluation.     3. Pain status: Pain free.    4. Return to near baseline physical activity: No     Discharge Planner Nurse   Safe discharge environment identified: Yes  Barriers to discharge: No       Entered by: Maegan Moore 01/30/2019 10:39 PM     Please review provider order for any additional goals.   Nurse to notify provider when observation goals have been met and patient is ready for discharge.    Pt disoriented to time, situation & place. VSS. Denies pain.  On tele: SB (56), short frequent pauses per tech.   Calm & cooperative.noted to be sleeping.  LS dim, productive cough noted.  BS active, passing gas.  Incontinent of B&B  Blanchable redness to coccyx, barrier applied, repo Q 2hrs.  Up w/2 assist, walker, gb. Alarms on.

## 2019-01-31 NOTE — PROGRESS NOTES
Discharge Planner   Discharge Plans in progress: PT and wife prefer OTPT at [facility] rather than home care support.   Barriers to discharge plan: none. Pt  Did well with rehab staff today   Follow up plan: PT will  Need walker for DME at home. Wife would like to work  With [facility] for this as well as PT has recommendation for walker that is better set up for someone with Parkinson's   Called [facility] to set up EW assessment        Entered by: Corinne C. White 01/31/2019 1:16 PM

## 2019-01-31 NOTE — PROGRESS NOTES
SPIRITUAL HEALTH SERVICES Progress Note  [facility]  Med. Surg. 5    Saw pt Kashmir and his wife Mauri per staff referral requesting emotional support for Mauri.  Kashmir reported feeling emotionally well and denied having any concerns.  Mauri shared that they live with one of Kashmir's sons.  They are a blended family; Kashmir has five children from a previous marriage and Mauri has three.  Mauri expressed anxiety about deciding on the direction of Kashmir's care: whether he is able to return home or if he needs to live at a LTC facility.  Invited Mauri to the family lounge for a further conversation about her concerns, but she declined.  Reviewed how she and Kashmir can request further  support as needs arise.    No further plans; this author and other chaplains remain available per pt/family request.    Jaskaran Laird M.Div., [facility]  Staff   Pager [phone]

## 2019-02-04 NOTE — PROGRESS NOTES
Pt's wife called and was wondering about home care support that was ordered.  SW had met with pt and wife and they discussed outpt support.  Wife requests home care support.  [facility] Liaison aware of need.  She said home care team will contact primary  MD and get new orders.  I updated wife and she is aware it could take up to 24-48 hours to get orders from primary MD.  Referral sent to [facility].  Terrie COOMBS CTS [phone]

## 2019-02-11 NOTE — ED AVS SNAPSHOT
[facility]  [address]  [facility] [address]  Phone:  [phone]  Fax:  [phone]                                    Josafat Matson   MRN: [id number]    Department:  [facility]   Date of Visit:  2/11/2019           After Visit Summary Signature Page    I have received my discharge instructions, and my questions have been answered. I have discussed any challenges I see with this plan with the nurse or doctor.    ..........................................................................................................................................  Patient/Patient Representative Signature      ..........................................................................................................................................  Patient Representative Print Name and Relationship to Patient    ..................................................               ................................................  Date                                   Time    ..........................................................................................................................................  Reviewed by Signature/Title    ...................................................              ..............................................  Date                                               Time          22EPIC Rev 08/18

## 2019-02-12 NOTE — ED PROVIDER NOTES
History     Chief Complaint:  Hand Swelling    HPI   Josafat Matson is a 78 year old male, with a history of dementia, who presents with right hand swelling and pain beginning yesterday. He reports the pain is located on top of his right wrist. Wife states he was pushing himself out of the car and he might have injured it then. No history of gout. Wife denies fever.  The hand feels warm to touch.  He has never had this happen before.  No other complaints.  He does state his arm hurts as well.  He is not on blood thinners.    Allergies:  Tamsulosin   Terazosin     Medications:    Trazodone  Aspirin  Atorvastatin  Sinemet   Aricept  Namenda  Metformin   Nitroglycerin  Mirapex  Seroquel      Past Medical History:    A-Fib  Alzheimer's disease  CAD  DM type 2  HLD  HTN  MI  Seborrheic dermatitis    Past Surgical History:    Coronary stent   Cataracts    Family History:    History reviewed. No pertinent family history.    Social History:  Presents to the ED with his wife.   Tobacco Use: Former smoker (Quit: 1/1/1990)  Alcohol Use: Twice/week  PCP: MARC CASTELAN  Marital Status:   [2]     Review of Systems   Constitutional: Negative for fever.   Musculoskeletal:        Positive for right hand swelling and pain.    All other systems reviewed and are negative.    Physical Exam   First Vitals:  BP: 128/75  Pulse: 81  Heart Rate: 81  Temp: 97.8  F (36.6  C)  Resp: 14  SpO2: 98 %      Physical Exam  General: Patient is alert and interactive when I enter the room  Head:  The scalp, face, and head appear normal  Eyes:  Conjunctivae are normal  ENT:    The nose is normal    Pinnae are normal    External acoustic canals are normal  Neck:  Trachea midline  CV:  Pulses are normal.    Resp:  No respiratory distress   Abdomen:      Soft, non-tender, non-distended  Musc:  Normal muscular tone    No major joint effusions    Erythema to dorsum of right hand, warm to touch    Edema to dorsum of right hand extending to wrist and  fingers    No pain with ROM of hand, no deformity noted   Skin:  No rash or lesions noted  Neuro:  Speech is normal and fluent. Face is symmetric.     Moving all extremities well.   Psych: Awake. Alert.  Normal affect.  Appropriate interactions.    Emergency Department Course     Imaging:  Radiographic findings were communicated with the patient who voiced understanding of the findings.    US Upper Extremity Venous Duplex Right   Preliminary Result   IMPRESSION: No evidence for deep vein thrombus in the right upper   extremity.      XR Hand Right G/E 3 Views   Preliminary Result   IMPRESSION: No evidence for fracture, dislocation or significant   degenerative change of the right hand.        Laboratory:  CBC:  WBC 6.5, HGB 12.8 (L),  (L)  BMP: Glucose 179 (H), otherwise WNL (Creatinine 1.02)  CRP inflammation: 36.5 (H)  Sed rate: 33 (H)    Emergency Department Course:  The patient arrived in triage where vitals were measured and recorded.   The patient was then escorted back to the emergency department.   The patient's medical records were reviewed.  Nursing notes and vitals were reviewed.  2020: I performed an exam of the patient as documented above.  The above workup was undertaken.   I rechecked the patient and discussed results.    Findings and plan explained to the Patient. Patient discharged home, status improved, with instructions regarding supportive care, medications, and reasons to return as well as the importance of close follow-up was reviewed. Patient was prescribed keflex.     Impression & Plan      Medical Decision Making:  Josafat Matson is a 78 year-old male who presents with right hand swelling.  His right hand is definitely edematous with erythema and warmth to the dorsum of his hand.  It involves his hand and his fingers and wrists.  I think septic arthritis is less likely given he has good range of motion and it is more localized to the actual skin of the hand.  We did do an x-ray to  rule out any occult fracture.  X-ray was negative for fracture.  Ultrasound revealed no DVT.  He has good pulses and warm extremities but seems less likely to be arterial in nature.  ESR and CRP were mildly elevated.  White count was normal.  His temperature remained normal here.  It seems likely more than anything probably localized cellulitis to the dorsum of his right hand.  I think we can attempt oral antibiotics with outpatient management at this point.  He has no signs of sepsis at this point.  He will follow-up with his primary care doctor we will marked the skin.  I had a long discussion with the wife about return precautions.  I think necrotizing fasciitis is less likely given the indolent course and normal vitals.  Patient was discharged with Keflex and careful return precautions.    Diagnosis:    ICD-10-CM    1. Cellulitis of hand L03.119        Disposition:  discharged to home    Discharge Medications:     Medication List      There are no discharge medications for this visit.         I, Yadira Torres, am serving as a scribe on 2/11/2019 at 8:20 PM to personally document services performed by Dr. Huston based on my observations and the provider's statements to me.   [facility] EMERGENCY DEPARTMENT       Araceli Huston MD  02/12/19 [id number]

## 2019-02-12 NOTE — ED TRIAGE NOTES
Right hand swelling and pain started yesterday. Pain more to top area of right wrist. Denies any known injury or fall. CMST intact. ABCs intact.

## 2019-02-12 NOTE — ED NOTES
Discharge instructions gone over with pt and wife, verbalized understanding. Discharged home with plan for follow up and new prescriptions. Denies questions at time of discharge.

## 2019-03-10 PROBLEM — G93.40 ACUTE ENCEPHALOPATHY: Status: ACTIVE | Noted: 2019-01-01

## 2019-03-10 NOTE — ED NOTES
[facility]  ED Nurse Handoff Report    Josafat Matson is a 78 year old male   ED Chief complaint: Altered Mental Status  . ED Diagnosis:   Final diagnoses:   Altered mental status, unspecified altered mental status type   Generalized muscle weakness   Cellulitis of right hand   Gastritis with hemorrhage, unspecified chronicity, unspecified gastritis type   Anemia, unspecified type   Constipation, unspecified constipation type     Allergies:   Allergies   Allergen Reactions     Tamsulosin      Other reaction(s): Dizziness     Terazosin      Other reaction(s): Dizziness       Code Status: Full Code  Activity level - Baseline/Home:  Total Care. Activity Level - Current:   Total Care. Lift room needed: No. Bariatric: No   Needed: No   Isolation: No. Infection: Not Applicable.     Vital Signs:   Vitals:    03/10/19 1354 03/10/19 1357 03/10/19 1400 03/10/19 1707   BP: 127/79  127/79 139/84   Pulse: 108      Resp: 16   14   Temp: 98.3  F (36.8  C)   97.9  F (36.6  C)   TempSrc: Temporal   Oral   SpO2: 97%  99% 98%   Weight:  79.8 kg (176 lb)         Cardiac Rhythm:  ,      Pain level:    Patient confused: Yes. Patient Falls Risk: Yes.   Elimination Status: incontinent in brief   Patient Report - Initial Complaint: AMS.   Focused Assessment:   HPI   History obtained from family as patient is nonverbal.  Josafat Matson is a 78 year old male, with a complicated past medical history including Alzheimer disease, sadaf body dementia, AFIB, CAD, hypertension, hyperlipidemia, Parkinson's amongst others as noted who presents with spouse, daughter-in-law and son for evaluation of altered mental status that began last night. At baseline, per family, he is typically able to converse, however last night, patient was unable to do so. Family endorsed that the last time this happened, it was due to an infection. Of note, patient has a known infection, though with unknown cause, in his right hand that has been  ongoing and recently finished antibiotics 2 weeks ago. He had BS of 208. However, they noted this has been increasing in swelling and increased redness. Spouse is also concerned that patient also has not had any bowel movements in 5 days. She notes that patient has had issues with this since his colonoscopy in December. They tried senna-docusate, fruits and juice. They are also concerned that he has been less ambulatory as well. They deny any cough.    Tests Performed:   CT Abdomen Pelvis w Contrast   Final Result   IMPRESSION:   1. Probable rectal fecal impaction with colonic constipation. No bowel   obstruction, diverticulitis or evidence of appendicitis.   2. Patient is status post abdominal aortic stent graft repair. Stent   graft appears patent.   3. Questionable distal gastric wall thickening possibly reflecting   gastritis. Correlate with patient's clinical symptoms.   4. Cholelithiasis. No associated gallbladder inflammation is evident.      DAVION DE ANDA MD      CT Head w/o Contrast   Final Result   Impression:    1.  No acute findings.   2.  Nonspecific white matter changes are most likely due to mild small   vessel ischemic disease.        KELSI KERR MD      XR Hand Right G/E 3 Views   Preliminary Result   IMPRESSION: Soft tissue swelling over the dorsum of the metacarpal   heads and around the wrist. Widening of the scapholunate junction and   marked narrowing of the radiocarpal joint, unchanged. No evidence of   fracture.      XR Chest 1 View   Final Result   IMPRESSION: Normal.      ARNOLDO JOSEPH MD        . Abnormal Results:   Labs Ordered and Resulted from Time of ED Arrival Up to the Time of Departure from the ED   CBC WITH PLATELETS DIFFERENTIAL - Abnormal; Notable for the following components:       Result Value    RBC Count 2.95 (*)     Hemoglobin 8.9 (*)     Hematocrit 27.5 (*)     Platelet Count 118 (*)     All other components within normal limits   COMPREHENSIVE METABOLIC PANEL - Abnormal;  Notable for the following components:    Chloride 111 (*)     Glucose 188 (*)     Calcium 7.7 (*)     Bilirubin Total 1.4 (*)     Albumin 2.4 (*)     Protein Total 6.0 (*)     All other components within normal limits   LIPASE - Abnormal; Notable for the following components:    Lipase 409 (*)     All other components within normal limits   ROUTINE UA WITH MICROSCOPIC - Abnormal; Notable for the following components:    Ketones Urine 5 (*)     Mucous Urine Present (*)     All other components within normal limits   ISTAT  GASES LACTATE YUAN POCT - Abnormal; Notable for the following components:    PO2 Venous 24 (*)     All other components within normal limits   TROPONIN I   PERIPHERAL IV CATHETER   CARDIAC CONTINUOUS MONITORING   FREE WATER   ABO/RH TYPE AND SCREEN   BLOOD CULTURE   INFLUENZA A/B ANTIGEN   BLOOD CULTURE       Treatments provided: Warm blankets, abx, fluids, reassurance, updates to family on plan of care.  Family Comments: Wife and children are at bedside and updated on pt's plan of care.  OBS brochure/video discussed/provided to patient:  N/A  ED Medications:   Medications   0.9% sodium chloride BOLUS (0 mLs Intravenous Stopped 3/10/19 1709)     Followed by   sodium chloride 0.9% infusion (1,000 mLs Intravenous New Bag 3/10/19 1709)   cefTRIAXone (ROCEPHIN) 1 g vial to attach to  mL bag for ADULTS or NS 50 mL bag for PEDS (1 g Intravenous New Bag 3/10/19 1705)   iopamidol (ISOVUE-370) solution 500 mL (89 mLs Intravenous Given 3/10/19 1541)   0.9% sodium chloride BOLUS (0 mLs Intravenous Stopped 3/10/19 1552)   pantoprazole (PROTONIX) 40 mg IV push injection (40 mg Intravenous Given 3/10/19 1705)     Drips infusing:  Yes  For the majority of the shift, the patient's behavior Green.     Severe Sepsis OR Septic Shock Diagnosis Present: No      ED Nurse Name/Phone Number: Doris JERMAINE Rose,   5:12 PM  RECEIVING UNIT ED HANDOFF REVIEW    Above ED Nurse Handoff Report was reviewed: Yes  Reviewed  by: Lizzette Moore on March 10, 2019 at 5:42 PM

## 2019-03-10 NOTE — LETTER
Transition Communication Hand-off for Care Transitions to Next Level of Care Provider    Name: Josafat Matson  : 1940  MRN #: [id number]  Primary Care Provider: MARC BANSAL  Primary Care MD Name: Marc Bansal  Primary Clinic: [facility] [address] [place] [address]  Primary Care Clinic Name: [facility]   Reason for Hospitalization:  Generalized muscle weakness [M62.81]  Cellulitis of right hand [L03.113]  Constipation, unspecified constipation type [K59.00]  Altered mental status, unspecified altered mental status type [R41.82]  Anemia, unspecified type [D64.9]  Gastritis with hemorrhage, unspecified chronicity, unspecified gastritis type [K29.71]  Admit Date/Time: 3/10/2019  1:48 PM  Discharge Date: 3/14/19  Payor Source: Payor: MEDICARE / Plan: MEDICARE / Product Type: Medicare /     Readmission Assessment Measure (JONAS) Risk Score/category: Average           Reason for Communication Hand-off Referral: Fragility    Discharge Plan: [facility]       Concern for non-adherence with plan of care:   Yes, dementia  Discharge Needs Assessment:  Needs      Most Recent Value   Equipment Currently Used at Home  shower chair, cane, straight, wheelchair, manual   Home Care  [facility] [phone], Fax: [phone]   Skilled Nursing Facility  [facility] [phone], Fax: [phone]   PAS Number  [id number] [19]          Already enrolled in Tele-monitoring program and name of program:  NA  Follow-up specialty is recommended: No    Follow-up plan:  No future appointments.    Any outstanding tests or procedures:        Referrals     Future Labs/Procedures    Physical Therapy Adult Consult     Comments:    Evaluate and treat as clinically indicated.    Reason: Deconditioning    Speech Language Path Adult Consult     Comments:    Evaluate and treat as clinically indicated.    Reason:  dysphagia            Key Recommendations:  Pt admitted with  acute encephalopathy and right hand cellulitis.  MOD RISK.  Encephalopathy thought to be related to infection and patient was discharged on oral keflex. Pt has a history of Parkinson's and Alzheimers.  Pt was living with his wife and son and had [facility] RN/PT/OT/SW/HHA.  SW saw pt and patient discharged to LTC at [facility].      Kaley Knox & Sona Chris    AVS/Discharge Summary is the source of truth; this is a helpful guide for improved communication of patient story

## 2019-03-10 NOTE — ED TRIAGE NOTES
Pt arrives via EMS, per EMS, pt has a hx of lewy body dementia, and parkinson's. Pt at baseline is typically able to converse but last night was unable to, pt has a known infection in his hand that has been ongoing and last finished antibiotics about 2 weeks ago. Pt also having issues with constipation, last BM was 5 days ago. Pt alert, ABCs intact

## 2019-03-10 NOTE — ED PROVIDER NOTES
History     Chief Complaint:  Altered Mental Status    HPI   History obtained from family as patient is nonverbal.  Josafat Matson is a 78 year old male, with a complicated past medical history including Alzheimer disease, sadaf body dementia, AFIB, CAD, hypertension, hyperlipidemia, Parkinson's amongst others as noted who presents with spouse, daughter-in-law and son for evaluation of altered mental status that began last night. At baseline, per family, he is typically able to converse, however last night, patient was unable to do so. Family endorsed that the last time this happened, it was due to an infection. Of note, patient has a known infection, though with unknown cause, in his right hand that has been ongoing and recently finished antibiotics 2 weeks ago. He had BS of 208. However, they noted this has been increasing in swelling and increased redness. Spouse is also concerned that patient also has not had any bowel movements in 5 days. She notes that patient has had ongoing problems with constipation since his colonoscopy in December. They tried senna-docusate, fruits and juice.  His providers believe it is related to his Parkinson's meds for that.      His wife and family are also concerned that he has been less ambulatory as well for the past few days. They deny any cough, trouble breathing.    Allergies:  Tamsulosin  Terazosin     Medications:    Aspirin 81 mg  Atorvastatin  Sinemet  Aricept  Vitamin D2  Melatonin  Memantine  Metformin  Multivitamin  Nitroglycerin  Mirapex  Seroquel  Trazodone  Ascorbic acid    Past Medical History:    AFIB  Alzheimer disease  CAD  Diabetes 1.5, managed as type II  Heart attack  Hyperlipidemia  Hypertension  Non-allergic rhinitis  Seborrheic dermatitis  Sleep apnea  Stented coronary artery  Lewy body dementia  Parkinson's    Past Surgical History:    Cardiac surgery  - coronary stent placement  Cataract removal  Colonoscopy  Eye surgery - cataract removal  right  Phacoemulsification clear cornea with standard intraocular lens implant    Family History:    The patient denies any relevant family medical history.     Social History:  The patient was accompanied to the ED by family.  Smoking Status: Former - Quit 1990  Smokeless Tobacco: No  Alcohol Use: Yes  Drug Use: N/A   Marital Status:   [2]     Review of Systems   Unable to perform ROS: Patient nonverbal       Physical Exam   Vitals:  .  Patient Vitals for the past 24 hrs:   BP Temp Temp src Pulse Heart Rate Resp SpO2 Weight   03/10/19 1400 127/79 -- -- -- -- -- 99 % --   03/10/19 1357 -- -- -- -- -- -- -- 79.8 kg (176 lb)   03/10/19 1354 127/79 98.3  F (36.8  C) Temporal 108 108 16 97 % --   03/10/19 1352 -- -- -- -- -- 16 -- --      Physical Exam  Constitutional: Chronically ill-appearing.  Lying back in bed.  Eyes closed.  Non-cooperative with exam.  Responds to physical stimuli.  Wife provides all of the history.  Dry mucous membranes looks dehydrated.  HENT:   Head: Atraumatic.   Nose: Nose normal.  Mouth/Throat: Oral mucosa is clear and moist. no trismus. Pharynx normal. Tonsils symmetric. No tonsillar enlargement, erythema, or exudate.  Eyes: Conjunctivae normal. EOM normal. Pupils equal, round, and reactive to light. No scleral icterus.   Neck: Normal range of motion. Neck supple. No tracheal deviation present.   Cardiovascular: Normal rate, regular rhythm. No gallop. No friction rub. No murmur heard. Symmetric radial artery pulses   Pulmonary/Chest: Effort normal. No stridor. No respiratory distress. No wheezes. No rales. No rhonchi . No tenderness.   Abdominal: Soft. Bowel sounds normal. No distension. No mass.  Grimaces during abdominal exam, but difficult to localize tenderness.  Seems to be more in the lower abdomen..  In his lower midline is a palpable firm mobile mass, likely consistent with stool. No rebound. No guarding.   Musculoskeletal:   RUE: Normal range of motion in shoulder, elbow,  wrist.  There is mild redness and swelling diffusely on the dorsum of the hand as well as a little bit on the palm of the hand.  No fluctuance or crepitus to suggest necrotizing fasciitis.  No definite point tenderness or bony deformity.   LUE: Normal range of motion. No tenderness. No deformity  RLE: Normal range of motion. No edema. No tenderness. No deformity  LLE: Normal range of motion. No edema. No tenderness. No deformity  Lymph: No cervical adenopathy.   Neurological: Sleeping.  Arouses to stimulation and opens his eyes.  Does not cooperate with exam.  He does use his tongue to try to push my tongue blade out of his mouth and is protecting his airway.  He moves both upper extremities purposefully for instance during abdominal exam.  No apparent focal deficits.  CN II-VII intact. No apparent sensory deficit. GCS eye subscore is 3. GCS verbal subscore is 4. GCS motor subscore is 6.  Gait not assessable  Skin: Skin is warm and dry. No rash noted. No pallor. Normal capillary refill.  Psychiatric: Somnolent.  Nonverbal.  Limited.  Emergency Department Course     ECG:  ECG taken at 1501, ECG read at 1510 by Dr. Victor Manuel Britt MD  Normal sinus rhythm  Normal ECG  Rate 91 bpm. WY interval 196. QRS duration 90. QT/QTc 362/445. P-R-T axes 81 36 56.     Imaging:  Radiology findings were communicated with the family who voiced understanding of the findings.  XR Chest:  IMPRESSION: Normal.  Reading per radiology.     CT Head w/o Contrast:  Impression:   1.  No acute findings.  2.  Nonspecific white matter changes are most likely due to mild small  vessel ischemic disease.    Reading per radiology.     XR Hand Right:  IMPRESSION: Soft tissue swelling over the dorsum of the metacarpal  heads and around the wrist. Widening of the scapholunate junction and  marked narrowing of the radiocarpal joint, unchanged. No evidence of  fracture.  Reading per radiology.     CT Abdomen Pelvis w Contrast:  IMPRESSION:  1. Probable  rectal fecal impaction with colonic constipation. No bowel  obstruction, diverticulitis or evidence of appendicitis.  2. Patient is status post abdominal aortic stent graft repair. Stent  graft appears patent.  3. Questionable distal gastric wall thickening possibly reflecting  gastritis. Correlate with patient's clinical symptoms.  4. Cholelithiasis. No associated gallbladder inflammation is evident.  Reading per radiology.     Laboratory:  Laboratory findings were communicated with the family who voiced understanding of the findings.  CBC: HGB 8.9 (L),  (L) o/w WNL (WBC 4.9)  CMP: Chloride 111 (H), Glucose 188 (H), Calcium 7.7 (L), Bilirubin 1.4 (H), Albumin 2.4 (L), Protein Total 6.0 (L) o/w WNL (Creatinine 0.96)  Lipase: 409 (H)  Troponin (Collected 1402): 0.033  Blood Cultures: Pending  ISTAT gases lactate aileen POCT (Collected 1408): PO2 Venous 24 (L) o/w WNL    UA with Microscopic: Urineketon 5 (A), Mucous Urine Present (A) o/w WNL     Influenza A/B Antigen: Negative      Interventions:  1452 0.9% NaCl Bolus 1000 mL IV  1705 Rocephin 1 g IV  1705 Protonix 40 mg IV  1709 0.9% NaCl Bolus 1000 mL IV    Medications   0.9% sodium chloride BOLUS (0 mLs Intravenous Stopped 3/10/19 1709)     Followed by   sodium chloride 0.9% infusion (1,000 mLs Intravenous New Bag 3/10/19 1709)   iopamidol (ISOVUE-370) solution 500 mL (89 mLs Intravenous Given 3/10/19 1541)   0.9% sodium chloride BOLUS (0 mLs Intravenous Stopped 3/10/19 1552)   cefTRIAXone (ROCEPHIN) 1 g vial to attach to  mL bag for ADULTS or NS 50 mL bag for PEDS (1 g Intravenous New Bag 3/10/19 1705)   pantoprazole (PROTONIX) 40 mg IV push injection (40 mg Intravenous Given 3/10/19 1705)        Emergency Department Course:  Nursing notes and vitals reviewed.  IV was inserted and blood was drawn for laboratory testing, results above.  The patient provided a urine sample here in the emergency department. This was sent for laboratory testing, findings  above.  A nasal swab was obtained for laboratory testing, findings above.    The patient was sent for a XR Chest, CT Head w/o Contrast, XR Hand Right, CT Abdomen Pelvis w Contrast while in the emergency department, results above.    EKG obtained in the ED, see results above.      2:49 PM: I performed an exam of the patient as documented above. History obtained from family. Discussed likelihood of admission, based on patient's presentation and family is agreeable to this.   3:48 PM: I spoke with Radiology service regarding patient's presentation, findings, and plan of care. Only one view of chest could be obtained.  4:41 PM: Updated family regarding results. Discussed plan of care and family continues to be comfortable with admission.  4:52 PM: I spoke with Dr. Gannon of the Hospitalist service regarding patient's presentation, findings, and plan of care.     I discussed the treatment plan with the patient. They expressed understanding of this plan and consented to admission. I discussed the patient with Dr. Gannon, who will admit the patient to a monitored bed for further evaluation and treatment.     I personally reviewed the laboratory results with the spouse, son and daughter-in-law and answered all related questions prior to admission.    Impression & Plan      Medical Decision Making:  Josafat Matson is a 78 year old male with a history of Parkinson's disease, ongoing problems with constipation, recent right hand cellulitis presents to the ER today with his family from home for decreased mental status, generalized weakness, poor oral intake that have been progressing for the past couple of days.    In terms of his altered mental status head CT is negative for intracranial hemorrhage.  There is no clear focal deficit here to suggest stroke syndrome and with a slowly progressive onset of symptoms that seems unlikely.  Clearly not a TPA candidate.  No clear electrolyte disturbance such as hyponatremia or hypoglycemia  to cause his change in sensorium.  No clear evidence for acute coronary syndrome on EKG and troponin.  He does have possible site of infection in his right hand so his change in mental status could be related to infection and sepsis.  At this point he is hemodynamically stable.  We have ordered blood cultures and started antibiotics.  As he had recently been on a course of first generation cephalosporin, will add a third generation cephalosporin for broader coverage.  No definite risk factor for MRSA at this time.    His wife is also very concerned about recent trouble with constipation.  He is not had any bowel movement for several days.  CT scan of his abdomen does show fecal impaction but no clear bowel obstruction, perforation, abscess or other surgical emergency.  He will need a stool regimen while in the hospital but needs medical stabilization, hydration, improvement in his mental status first.    CT scan of his abdomen does show apparent gastritis.  No evidence for perforated ulcer.  Laboratory workup shows minimal elevations of lipase and bilirubin.  He has a solitary gallstone seen in his gallbladder on CT but no evidence for cholecystitis.  With other LFTs being normal and no clear upper abdominal pain, I do not think this represents acute cholangitis or choledocholithiasis.  Would monitor LFTs and lipase for change.    Patient has a drop in his hemoglobin from around 12 to about 9 from last month to today.  His wife says he has a history of this and has had iron infusions for his anemia in the past.  No clear melena or hematochezia but I suspect this may be a slow upper GI bleed from his gastritis.  At this point his blood pressure and pulse rate are stable so no indication for emergent transfusion.    We administered IV fluids here in the ER because he was clinically dehydrated.  He is really not had much improvement in his mental status or strength yet.  Is not stable for discharge home.  Will admit to  hospitalist service.    Critical Care time was 0 minutes for this patient excluding procedures.     Diagnosis:    ICD-10-CM    1. Altered mental status, unspecified altered mental status type R41.82    2. Generalized muscle weakness M62.81    3. Cellulitis of right hand L03.113    4. Gastritis with hemorrhage, unspecified chronicity, unspecified gastritis type K29.71    5. Anemia, unspecified type D64.9    6. Constipation, unspecified constipation type K59.00         Disposition:   Admission.    CMS Diagnoses: None          Scribe Disclosure:  I, Valery Howard, am serving as a scribe at 2:49 PM on 3/10/2019 to document services personally performed by Victor Manuel Britt MD, based on my observations and the provider's statements to me.  3/10/2019   [facility] EMERGENCY DEPARTMENT       Victor Manuel Britt MD  03/11/19 1002

## 2019-03-10 NOTE — PHARMACY-ADMISSION MEDICATION HISTORY
Admission medication history interview status for this patient is complete. See [facility] admission navigator for allergy information, prior to admission medications and immunization status.     Medication history interview source(s):Family  Medication history resources (including written lists, pill bottles, clinic record):EPIC, Rx bottles    Changes made to PTA medication list:  Added: Sinemet prn  Deleted: Vit C, D,  Changed: Mirapex, Donepezil, Trazodone    Medication reconciliation/reorder completed by provider prior to medication history? No    For patients on insulin therapy: N (Y/N)  Lantus/levemir/NPH/Mix 70/30 dose:   (Y/N) (see Med list for doses)   Sliding scale Novolog Y/N  If Yes, do you have a baseline novolog pre-meal dose:  units with meals  Patients eat three meals a day:   Y/N    How many episodes of hypoglycemia do you have per week: _______  How many missed doses do you have per week: ______  How many times do you check your blood glucose per day: _______  Do you have a Continuous glucose monitor (CGM)   Y/N (remind pt that not approved for hospital use)   Any Barriers to therapy - Be specific :  cost of medications, comfortable with giving injections (if applicable), comfortable and confident with current diabetes regimen: Y/N ______________      Prior to Admission medications    Medication Sig Last Dose Taking? Auth Provider   aspirin (ASA) 81 MG EC tablet Take 81 mg by mouth daily 3/9/2019 at am Yes Unknown, Entered By History   Atorvastatin Calcium (LIPITOR PO) Take 80 mg by mouth daily 3/9/2019 at am Yes Reported, Patient   carbidopa-levodopa (SINEMET)  MG per tablet Take 2 tablets by mouth 3 times daily 8am, 12pm, 4pm 3/10/2019 at 1x  Yes Reported, Patient   carbidopa-levodopa (SINEMET)  MG tablet Take 1 tablet by mouth daily as needed (between 2am and 6 am if restless)  Yes Unknown, Entered By History   donepezil (ARICEPT) 10 MG tablet Take 10 mg by mouth  daily  3/9/2019 at  Unknown time Yes Reported, Patient   Melatonin 10 MG TABS tablet Take 15 mg by mouth At Bedtime  3/9/2019 at Unknown time Yes Unknown, Entered By History   Memantine HCl (NAMENDA PO) Take 10 mg by mouth 2 times daily  3/9/2019 at Unknown time Yes Reported, Patient   metFORMIN (GLUCOPHAGE) 500 MG tablet Take 500 mg by mouth 2 times daily (with meals) 3/10/2019 at am Yes Unknown, Entered By History   multivitamin, therapeutic with minerals (MULTI-VITAMIN) TABS Take 1 tablet by mouth daily 3/9/2019 at am Yes Reported, Patient   nitroGLYcerin (NITROSTAT) 0.4 MG sublingual tablet Place 0.4 mg under the tongue every 5 minutes as needed for chest pain For chest pain place 1 tablet under the tongue every 5 minutes for 3 doses. If symptoms persist 5 minutes after 1st dose call 911.  Yes Unknown, Entered By History   pramipexole (MIRAPEX) 0.75 MG tablet Take 0.375 mg by mouth At Bedtime  3/9/2019 at Unknown time Yes Unknown, Entered By History   TRAZODONE HCL PO Take 50 mg by mouth At Bedtime  3/9/2019 at Unknown time Yes Reported, Patient

## 2019-03-11 NOTE — PROGRESS NOTES
SPIRITUAL HEALTH SERVICES Progress Note  [facility]  Med. Surg. 5    Saw pt Buzz per an admission request for  support.  Kashmir was resting but his wife Mauri was present.  Mauri acknowledged that she is no longer physically able to care for Kashmir at home and they have financial difficulties.  She became tearful as she shared that she does not want to stop caring for him a home but she realizes that he needs LTC.  Offered to refer Kashmir to , which Mauri accepted.  Kashmir awoke during our conversation, and Mauri requested prayer, which was provided.  Offered reflective listening, affirmation, and validation of feelings.    There was an Advance Care Planning consult.  Mauri wanted to clarify that although Kashmir's healthcare directive is invalid, she is his next of kin and surrogate medical decision-maker.    This author and other chaplains remain available per family request.    Jaskaran Laird M.Div., [facility]  Staff   Pager [phone]

## 2019-03-11 NOTE — PROGRESS NOTES
[facility]  Patient is currently open to home care services with [facility].  The patient is currently receiving RN PT OT HHA services.  [facility]  and team have been notified of patient admission.  [facility] liaison will continue to follow patient during stay.  If appropriate provide orders to resume home care at time of discharge.

## 2019-03-11 NOTE — CONSULTS
Care Transition Initial Assessment - SW     Met with: wife Nata   Active Problems:    Acute encephalopathy       DATA  Lives With: child(abel), adult, spouse   Pt and his wife moved in with her son this past winter into a home that pt needs to ambulate 9 stairs. PT and wife moved in with family because they were unable to afford living on their own    Quality of Family Relationships: involved, supportive, helpful.  PT has support at home from his wife, son, DIL and grandson. Pt needs 24 hour care at this time  Has been open to [facility] RN/PT/OT HHA support        Identified issues/concerns regarding health management: Admitted with Acute encephalopathy. Has H.o Parkinson and Lewy body dementia    @   Resources List: Home Care, [facility]   Skilled Nursing Facility- will need TCU vs LTC>      Quality of Family Relationships: involved, supportive, helpful     ASSESSMENT  Cognitive Status:  SW did not speak with pt today  Concerns to be addressed: Met with pt's wife outside the room. Up until 3 weeks ago pt was able to ambulate limited distance with his walker and gait belt.  PT for the past three weeks has been more W/C bound and needing more support for feeding. PT's grandson who has been able to help during the day will be moving out in 30 days. PT would then be home alone with his wife as his son and DIL work full time  PT was to have an EW assessment with Lizzette for tomorrow with [facility]  Wife did bring in copy of LT MA application that was sent to the [facility] about 2 weeks ago.   Family were hoping the one pt was screened he would be able to move into a MCU for support. Pts wife is not able to keep caring for pt as his care needs differ from day to day  .  PT has a W/C at home with gait belt   SW spoke with wife about sending referrals to a number of facilities in the [facility]. [facility]  Wife understands that finding placement may be difficult due to bed status in the community and pt's needs       PLAN  SW will send referral out for TCU/LTC. PT due to dementia may not be a great TCU referral . Spoke with wife about looking for a LTC bed to prevent pt needing t be transferred if TCU is the appropriate Recommendation  It is doubt full pt will be able to return to home at this time  Spoke with wife about the needs for a possible deposit.      CM: CORRINE did call [facility] to check bed status  [facility] homes not LTC bed.

## 2019-03-11 NOTE — H&P
[facility]  Hospitalist Admission Note  March 10, 2019  Name: Josafat Matson    MRN: [id number]  YOB: 1940    Age: 78 year old  Date of admission: 3/10/2019  Primary care provider: Georgiana Bansal      Summary:  Patient is a 78-year-old male with a complex history including Alzheimer's and Lewy body dementia, Parkinson's disease, coronary artery disease status post MI, chronic anemia, type 2 diabetes, abdominal aortic stent graft repair, hypertension, paroxysmal atrial fibrillation, and hyperlipidemia who presents here with altered mental status.     Problem list/Plan:  1. Acute encephalopathy: Suspect secondary to probable recurrent right hand cellulitis and dehydration.  Agree with IV ceftriaxone for now but if this fails to improve with IV antibiotics, could consider infectious disease and orthopedic hand consultation for recurrent cellulitis.  2. Recurrent right hand cellulitis: IV ceftriaxone as above.  3. History of Alzheimer's dementia along with Lewy body dementia: Unsafe to take p.o. Sinemet, Aricept, and Namenda at this time.  If unimproved in the morning, will have to be discussed with pharmacy on other options.  Otherwise, may need to discuss placement of Keofeed tube for medication administration and possibly enteral nutrition.  4. Hyperlipidemia: Holding chronic atorvastatin while patient is n.p.o.  5. Type 2 diabetes: Chronically on metformin 500 mg p.o. twice daily.  N.p.o. for now and will place patient on a medium intensity sliding scale n.p.o. protocol.  6. Chronic anemia without evidence of acute blood loss anemia  7. Hypertension: Not notably on any antihypertensives.  8. History of paroxysmal atrial fibrillation: Currently in normal sinus rhythm and not chronically on any anticoagulation.      -Additional workup plan which will include speech pathology evaluation for swallow eval when patient is more alert (this has not been ordered yet)    All lab  work and imaging data independently reviewed by myself    Prophylaxis;  PCD's/Ambulation.     Code status: Full code as discussed with wife    Discharge: To be determined    Time spent: Greater than 40 minutes  Chief Complaint:   Altered mental status   HPI history is provided by wife as patient is too lethargic to assist with the history  Patient is a 78-year-old male with a complex history including Alzheimer's and Lewy body dementia, Parkinson's disease, coronary artery disease status post MI, chronic anemia, type 2 diabetes, abdominal aortic stent graft repair, hypertension, paroxysmal atrial fibrillation, and hyperlipidemia who presents here with altered mental status. According to the wife, he is able to communicate his needs but the evening prior to admission, patient was pretty much nonverbal.  She reports that when this happens, it is usually due to an infection.  Patient was diagnosed with a right hand cellulitis 2 weeks ago and finished a course of antibiotics but since then, they noted that there is been some increase in swelling and redness.  She also reports that the patient is quite obstipated and has not had a bowel movements for 5 days.  Patient lives at home with his wife who is his primary caregiver. They did try some senna along with fruits and juice but to no avail.  No other localizing source of infection.  Beyond this, there is been no other complaints.  Workup in the ED demonstrated a negative influenza swab.  CT of the head was negative and CT of the abdomen and pelvis does demonstrate some rectal fecal impaction but no acute intra-abdominal findings.  Patient was given a dose of IV ceftriaxone for his recurrent right hand cellulitis and admitted for further inpatient cares.  I did discuss the case in detail with the ED physician.     Past Medical History:     Past Medical History:   Diagnosis Date     A-fib (H)      Alzheimer's disease      Coronary artery disease      Diabetes 1.5, managed  as type 2 (H)      Heart attack (H)      Hyperlipidemia      Hypertension      Non-allergic rhinitis      Seborrheic dermatitis      Sleep apnea      Stented coronary artery      Past Surgical History:     Past Surgical History:   Procedure Laterality Date     CARDIAC SURGERY      coronary stent placement     cataract removal[       COLONOSCOPY       EYE SURGERY      cataract removal, right     PHACOEMULSIFICATION CLEAR CORNEA WITH STANDARD INTRAOCULAR LENS IMPLANT  2014    Procedure: PHACOEMULSIFICATION CLEAR CORNEA WITH STANDARD INTRAOCULAR LENS IMPLANT;  LEFT PHACOEMULSIFICATION CLEAR CORNEA WITH STANDARD INTRAOCULAR LENS IMPLANT ;  Surgeon: Jovan Kam MD;  Location: [facility]     Social History:     Social History     Tobacco Use     Smoking status: Former Smoker     Last attempt to quit: 1990     Years since quittin.2     Smokeless tobacco: Never Used   Substance Use Topics     Alcohol use: Yes     Comment: 2/week     Drug use: Not on file     Family History:  Family history reviewed. NO pertinent family history     Allergies:     Allergies   Allergen Reactions     Tamsulosin      Other reaction(s): Dizziness     Terazosin      Other reaction(s): Dizziness     Medications:     Medications Prior to Admission   Medication Sig Dispense Refill Last Dose     aspirin (ASA) 81 MG EC tablet Take 81 mg by mouth daily   3/9/2019 at am     Atorvastatin Calcium (LIPITOR PO) Take 80 mg by mouth daily   3/9/2019 at am     carbidopa-levodopa (SINEMET)  MG per tablet Take 2 tablets by mouth 3 times daily 8am, 12pm, 4pm   3/10/2019 at 1x      carbidopa-levodopa (SINEMET)  MG tablet Take 1 tablet by mouth daily as needed (between 2am and 6 am if restless)        donepezil (ARICEPT) 10 MG tablet Take 10 mg by mouth daily    3/9/2019 at am     Melatonin 10 MG TABS tablet Take 15 mg by mouth At Bedtime    3/9/2019     Memantine HCl (NAMENDA PO) Take 10 mg by mouth 2 times daily    3/9/2019 at  "Unknown time     metFORMIN (GLUCOPHAGE) 500 MG tablet Take 500 mg by mouth 2 times daily (with meals)   3/10/2019 at am     multivitamin, therapeutic with minerals (MULTI-VITAMIN) TABS Take 1 tablet by mouth daily   3/9/2019 at am     nitroGLYcerin (NITROSTAT) 0.4 MG sublingual tablet Place 0.4 mg under the tongue every 5 minutes as needed for chest pain For chest pain place 1 tablet under the tongue every 5 minutes for 3 doses. If symptoms persist 5 minutes after 1st dose call 911.        pramipexole (MIRAPEX) 0.75 MG tablet Take 0.375 mg by mouth At Bedtime    3/9/2019 at Unknown time     TRAZODONE HCL PO Take 50 mg by mouth At Bedtime    3/9/2019 at Unknown time       Review of Systems:   A Comprehensive greater than 10 system review of systems was carried out.  Pertinent positives and negatives are noted above.  Otherwise negative for contributory information.        Physical Exam:  Blood pressure 150/77, pulse 82, temperature 98.1  F (36.7  C), temperature source Oral, resp. rate 16, height 1.854 m (6' 1\"), weight 76.4 kg (168 lb 8 oz), SpO2 97 %.  Gen: Lethargic but responds to tactile stimuli.  Protecting airways and otherwise in no acute distress.  HEENT: NCAT.  PERRL.  Neck: Normal inspection. No bruit, JVD or thyromegaly.  Lungs: Normal respiratory effort. Clear to auscultation bilaterally with no crackles or wheezes.  Card: N s1s2. RRR. No M/R/G.  Peripheral pulses present and symmetric.   Abd: Soft NT ND. No mass. Normal bowel sounds.  Skin: No rash. Warm to the touch  Extr: No edema. CMS intact  Psychiatric: Unable to assess  Neurologic: Pupils constricted but equal and reactive to light    Data:     Recent Labs   Lab 03/10/19  1402   WBC 4.9   HGB 8.9*   HCT 27.5*   MCV 93   *     Recent Labs   Lab 03/10/19  1402      POTASSIUM 3.4   CHLORIDE 111*   CO2 26   ANIONGAP 6   *   BUN 17   CR 0.96   GFRESTIMATED 76   GFRESTBLACK 88   CAMILLA 7.7*   PROTTOTAL 6.0*   ALBUMIN 2.4*   BILITOTAL " 1.4*   ALKPHOS 87   AST 34   ALT 9       Imaging:   Recent Results (from the past 24 hour(s))   XR Hand Right G/E 3 Views    Narrative    RIGHT HAND THREE VIEWS   3/10/2019 3:44 PM     HISTORY: Right hand swelling.    COMPARISON: 2/11/2019      Impression    IMPRESSION: Soft tissue swelling over the dorsum of the metacarpal  heads and around the wrist. Widening of the scapholunate junction and  marked narrowing of the radiocarpal joint, unchanged. No evidence of  fracture.   XR Chest 1 View    Narrative    CHEST ONE VIEW  3/10/2019 3:50 PM     HISTORY: altered mental status    COMPARISON: None.      Impression    IMPRESSION: Normal.    ARNOLDO JOSEPH MD   CT Head w/o Contrast    Narrative    Clinical History: Altered level of consciousness (LOC), unexplained    Technique: Noncontrast axial CT images of the head were obtained. This  CT Scan used dose modulation, iterative reconstruction, and/or weight  based dosing when appropriate to reduce radiation dose to as low as  reasonably achievable.    Comparison: Brain MRI 1/29/2019    Findings: There is mild cerebral volume loss. Patchy low attenuation  of the white matter is nonspecific, though most likely due to mild  small vessel ischemic disease. No evidence of hemorrhage, mass or  recent infarction. The cerebellum and brainstem are unremarkable. The  globes and orbits are unremarkable.  The imaged portions of the  paranasal sinuses and mastoid air cells are clear.  The skull base and  calvaria are intact.        Impression    Impression:   1.  No acute findings.  2.  Nonspecific white matter changes are most likely due to mild small  vessel ischemic disease.      KELSI KERR MD   CT Abdomen Pelvis w Contrast    Narrative    CT ABDOMEN AND PELVIS WITH CONTRAST 3/10/2019 4:02 PM     HISTORY: Abdominal pain, unspecified.    TECHNIQUE: Axial images from the lung bases to the symphysis are  performed with additional coronal reformatted images. 89 mL of Isovue  370 are  given intravenously.  Radiation dose for this scan was reduced  using automated exposure control, adjustment of the mA and/or kV  according to patient size, or iterative reconstruction technique.    FINDINGS:     The lung bases are clear.    Abdomen: Respiratory motion artifact is present through the upper  abdomen. Calcified gallstone is noted in the gallbladder. Gallbladder  is otherwise within normal limits. The liver, spleen, pancreas,  adrenal glands and kidneys are unremarkable. No hydronephrosis or  renal calculi. Right renal cortical scarring is noted. Aorta  demonstrates extensive calcified plaque and is status post abdominal  aortic aneurysm stent graft repair. No bowel obstruction or  diverticulitis. Probable constipation with rectal fecal impaction.  Appendix appears normal. Mild distal gastric wall thickening is noted  possibly due to motion artifact versus inflammation in the distal  stomach. No enlarged lymph nodes. No free intraperitoneal air.    Pelvis: The bladder is unremarkable. Prostate gland is mildly  enlarged. Rectum is distended with fecal debris but is otherwise  unremarkable. No enlarged pelvic or inguinal lymph nodes. No free  pelvic fluid. Degenerative spine changes are present.      Impression    IMPRESSION:  1. Probable rectal fecal impaction with colonic constipation. No bowel  obstruction, diverticulitis or evidence of appendicitis.  2. Patient is status post abdominal aortic stent graft repair. Stent  graft appears patent.  3. Questionable distal gastric wall thickening possibly reflecting  gastritis. Correlate with patient's clinical symptoms.  4. Cholelithiasis. No associated gallbladder inflammation is evident.    MD Hi MCMANUS MD Pager [phone]

## 2019-03-11 NOTE — PLAN OF CARE
Patient arrived to floor at 1800. Lethargic and responding to touch and voice. Up with lift. Wound on coccyx, WOC consult placed. NPO, speech consult tomorrow. IV fluids running. Continuing IV antibiotics. Right hand swelling, elevated on pillow. Turned and repositioned frequently. Wife at bedside and updated with patients POC.

## 2019-03-11 NOTE — PLAN OF CARE
Ambulatory Status:  Pt up A2 with lift. NOOB this shift. Turned and reposition Q2H.  VS:  temp max 99.0. Stable.  Pain:  denies.  Resp: LS diminished on 2L O2 NC.  GI:  denies nausea.  fair appetite and on dental soft diet.  BS hypoactive.  Passing flatus.  Last BM 3/11. 4X BM this shift.  :  incont.  Skin:  foam dressing on coccyx changed/CDI.  Tx:  rocephin.  Labs:  ; 194.  Consults:  speech/WOC  Disposition:  TBD

## 2019-03-11 NOTE — ACP (ADVANCE CARE PLANNING)
"SPIRITUAL HEALTH SERVICES Progress Note   [facility] Advance Directive Education    Responded to a consult order for Advance Care Planning (ACP) education for Josafat \"Kashmir\" Dano.  His wife Mauri was present.  Kashmir has dementia and therefore is not able to engage in ACP.  Mauri is his next of kin and medical surrogate decision-maker.     Jaskaran Laird M.Div., [facility]  Staff   Pager [phone]  "

## 2019-03-11 NOTE — PROGRESS NOTES
[facility]    Hospitalist Progress Note  Name: Josafat Matson    MRN: [id number]  Provider: Madeline Yang MD  Date of Service: 03/11/2019    Assessment & Plan   Summary of Stay: Josafat Matson is a 78 year old male who was admitted on 3/10/2019 for altered mental status.  Patient with past medical history significant for Alzheimer's with Lewy body dementia, Parkinson's disease, coronary artery disease status post MI, chronic anemia, type 2 diabetes mellitus, abdominal aortic stent graft repair, hypertension, paroxysmal atrial fibrillation and hyperlipidemia.    Altered mental status secondary to acute encephalopathy  --Suspected secondary to probable recurrent right hand cellulitis and dehydration  --CT head negative  --UA negative  --Started on IV ceftriaxone will continue for now  --Careful hydration    Right hand cellulitis  --IV ceftriaxone    History of examiner's dementia along with Lewy body dementia  --Prior to admission Sinemet, Aricept and Namenda.  Will hold as patient could not take orally due to mental status change    Hyperlipidemia  --Prior to admission on statins on hold for now    Diabetes mellitus type 2  --Prior to admission on metformin 500 twice daily.  N.p.o. for now  --On sliding scale insulin    Chronic anemia without evidence of blood loss    Hypertension  --Not on any meds prior to admission    Paroxysmal atrial fibrillation  --Currently in sinus rhythm  --Not on any anticoagulation        DVT Prophylaxis: Pneumatic Compression Devices  Code Status: Full Code    Disposition: Expected discharge tbd.  Patient was somnolent not taking anything orally      Interval History   Patient with dementia Sleepy not answering questions.  Per wife he seemed a bit better than yesterday was able to recognize her.  But was sleepy and would not communicate with her.  Has not had much oral intake yet today.  Patient was sleepy from last night.  Unable to get any review of systems from  patient    -Data reviewed today: I reviewed all new labs and imaging reports over the last 24 hours. I personally reviewed no images or EKG's today.    Physical Exam   Temp: 96.8  F (36  C) Temp src: Axillary BP: 137/63 Pulse: 82 Heart Rate: 74 Resp: 14 SpO2: 97 % O2 Device: None (Room air)    Vitals:    03/10/19 1357 03/10/19 1900   Weight: 79.8 kg (176 lb) 76.4 kg (168 lb 8 oz)     Vital Signs with Ranges  Temp:  [96.8  F (36  C)-98.3  F (36.8  C)] 96.8  F (36  C)  Pulse:  [] 82  Heart Rate:  [] 74  Resp:  [14-16] 14  BP: (127-150)/(63-84) 137/63  SpO2:  [97 %-99 %] 97 %  I/O last 3 completed shifts:  In: 915 [I.V.:915]  Out: -       GEN: Sleeping, NAD.  HEENT:  Normocephalic/atraumatic, no scleral icterus, no nasal discharge, mouth moist.  CV:  Regular rate and rhythm, no murmur or JVD.  S1 + S2 noted, no S3 or S4.  LUNGS:  Clear to auscultation bilaterally without rales/rhonchi/wheezing/retractions.  Symmetric chest rise on inhalation noted.  ABD:  Active bowel sounds, soft, non-tender/non-distended.  No rebound/guarding/rigidity.  EXT:  No edema.  No cyanosis.  Right hand swelling noted per  SKIN:  Dry to touch, erythema and right hand swelling    Medications     sodium chloride 100 mL/hr at 03/11/19 0445       cefTRIAXone  1 g Intravenous Q24H     insulin aspart  1-6 Units Subcutaneous Q4H     Data     Recent Labs   Lab 03/11/19  0727 03/10/19  1402   WBC 4.7 4.9   HGB 11.4* 8.9*   HCT 34.4* 27.5*   MCV 92 93    118*     Recent Labs   Lab 03/11/19  0727 03/10/19  1402    143   POTASSIUM 3.7 3.4   CHLORIDE 111* 111*   CO2 27 26   ANIONGAP 5 6   * 188*   BUN 14 17   CR 0.97 0.96   GFRESTIMATED 75 76   GFRESTBLACK 87 88   CAMILLA 8.0* 7.7*     Recent Labs   Lab 03/10/19  1525 03/10/19  1401   CULT No growth after 12 hours No growth after 12 hours     No results for input(s): NTBNPI, NTBNP in the last 168 hours.  Recent Labs   Lab 03/10/19  1402   AST 34   ALT 9   ALKPHOS 87   BILITOTAL  1.4*     No results for input(s): INR in the last 168 hours.  Recent Labs   Lab 03/10/19  1408   LACT 1.5     Recent Labs   Lab 03/10/19  1526   COLOR Yellow   APPEARANCE Clear   URINEGLC Negative   URINEBILI Negative   URINEKETONE 5*   SG 1.020   UBLD Negative   URINEPH 5.0   PROTEIN Negative   NITRITE Negative   LEUKEST Negative   RBCU <1   WBCU 1       Recent Results (from the past 24 hour(s))   XR Hand Right G/E 3 Views    Narrative    RIGHT HAND THREE VIEWS   3/10/2019 3:44 PM     HISTORY: Right hand swelling.    COMPARISON: 2/11/2019      Impression    IMPRESSION: Soft tissue swelling over the dorsum of the metacarpal  heads and around the wrist. Widening of the scapholunate junction and  marked narrowing of the radiocarpal joint, unchanged. No evidence of  fracture.    ARNOLDO JOSEPH MD   XR Chest 1 View    Narrative    CHEST ONE VIEW  3/10/2019 3:50 PM     HISTORY: altered mental status    COMPARISON: None.      Impression    IMPRESSION: Normal.    ARNOLDO JOSEPH MD   CT Head w/o Contrast    Narrative    Clinical History: Altered level of consciousness (LOC), unexplained    Technique: Noncontrast axial CT images of the head were obtained. This  CT Scan used dose modulation, iterative reconstruction, and/or weight  based dosing when appropriate to reduce radiation dose to as low as  reasonably achievable.    Comparison: Brain MRI 1/29/2019    Findings: There is mild cerebral volume loss. Patchy low attenuation  of the white matter is nonspecific, though most likely due to mild  small vessel ischemic disease. No evidence of hemorrhage, mass or  recent infarction. The cerebellum and brainstem are unremarkable. The  globes and orbits are unremarkable.  The imaged portions of the  paranasal sinuses and mastoid air cells are clear.  The skull base and  calvaria are intact.        Impression    Impression:   1.  No acute findings.  2.  Nonspecific white matter changes are most likely due to mild small  vessel  ischemic disease.      KELSI KERR MD   CT Abdomen Pelvis w Contrast    Narrative    CT ABDOMEN AND PELVIS WITH CONTRAST 3/10/2019 4:02 PM     HISTORY: Abdominal pain, unspecified.    TECHNIQUE: Axial images from the lung bases to the symphysis are  performed with additional coronal reformatted images. 89 mL of Isovue  370 are given intravenously.  Radiation dose for this scan was reduced  using automated exposure control, adjustment of the mA and/or kV  according to patient size, or iterative reconstruction technique.    FINDINGS:     The lung bases are clear.    Abdomen: Respiratory motion artifact is present through the upper  abdomen. Calcified gallstone is noted in the gallbladder. Gallbladder  is otherwise within normal limits. The liver, spleen, pancreas,  adrenal glands and kidneys are unremarkable. No hydronephrosis or  renal calculi. Right renal cortical scarring is noted. Aorta  demonstrates extensive calcified plaque and is status post abdominal  aortic aneurysm stent graft repair. No bowel obstruction or  diverticulitis. Probable constipation with rectal fecal impaction.  Appendix appears normal. Mild distal gastric wall thickening is noted  possibly due to motion artifact versus inflammation in the distal  stomach. No enlarged lymph nodes. No free intraperitoneal air.    Pelvis: The bladder is unremarkable. Prostate gland is mildly  enlarged. Rectum is distended with fecal debris but is otherwise  unremarkable. No enlarged pelvic or inguinal lymph nodes. No free  pelvic fluid. Degenerative spine changes are present.      Impression    IMPRESSION:  1. Probable rectal fecal impaction with colonic constipation. No bowel  obstruction, diverticulitis or evidence of appendicitis.  2. Patient is status post abdominal aortic stent graft repair. Stent  graft appears patent.  3. Questionable distal gastric wall thickening possibly reflecting  gastritis. Correlate with patient's clinical symptoms.  4.  Cholelithiasis. No associated gallbladder inflammation is evident.    DAVION DE ANDA MD

## 2019-03-11 NOTE — PLAN OF CARE
"Slept through the night. Turned Q2 hours  VSS, afebrile, on ra  NPO except ice chips and meds. Nothing po given   Mepilex to sacrum, otherwise skin intact, swelling to right hand and blanchable redness to palm, no evidence of pain  Incont of urine, no BM, hypoactive BS   ml/hr, Rocephin IV  Said \"Hi\" when nurse asked patient to say Hi  Q4 BG, 144, 149  "

## 2019-03-11 NOTE — PLAN OF CARE
Patient lethargic and minimal response to touch this AM. Turned and repositioned q2hrs. Voiding good amounts. NPO, IV fluids running. Trace edema in right hand. Suctioned and oral cares q2hrs. This afternoon, patient alert and oriented x4. Pills swallowed crushed in apple sauce, patient able to swallow without any issues or concerns. Sup given this afternoon for fecal impaction. Continuing IV antibiotics. Blood sugar checks 154 and 154 sliding scale given. Wife at bedside and updated with POC. Discharge pending.

## 2019-03-11 NOTE — PROGRESS NOTES
[facility] Nurse Inpatient Adult Pressure Injury (PI) Wound Assessment     Assessment of PI(s) on pt's:   Coccyx    Data:   Patient History:      per MD note(s):  78-year-old male with a complex history including Alzheimer's and Lewy body dementia, Parkinson's disease, coronary artery disease status post MI, chronic anemia, type 2 diabetes, abdominal aortic stent graft repair, hypertension, paroxysmal atrial fibrillation, and hyperlipidemia who presents here with altered mental status.     Mattress:  Standard , Atmos Air mattress  Current pressure relieving devices:  Mepilex dressing and Pillows    Moisture Management:  Incontinence Protocol and Diaper    Catheter secured? Not applicable    Current Diet / Nutrition:       Orders Placed This Encounter        NPO for Medical/Clinical Reasons Except for: Ice Chips, Meds        Rodrigo Risk Assessment  Sensory Perception: 2-->very limited    Moisture: 3-->occasionally moist   Activity: 2-->chairfast     Mobility: 2-->very limited   Nutrition: 2-->probably inadequate   Rodrigo Score: 13    Labs:   Recent Labs   Lab Test 03/11/19  0727 03/10/19  1402 02/11/19  2033   ALBUMIN  --  2.4*  --    HGB 11.4* 8.9* 12.8*   WBC 4.7 4.9 6.5   A1C  --  6.9*  --    CRP  --   --  36.5*                                                                                                                          Pressure Injury Assessment  (location #1):   Coccyx     Wound History:   Present on admission    Specific Dimensions (length x width x depth, in cm) :   1.4 x 1 x 0.1cm moist pink white wound base    Periwound Skin: intact with blanchable erythema, very thin frail skin over bony prominences,      Drainage:    Amount: scant,  Color: serosanguinous       Odor: none    Pain:  No complaints ,          Intervention:     Patient's chart evaluated.      Rodrigo Interventions:  Current Rodrigo Interventions and Care Plan reviewed and updated, appropriate at this time.    Wound  was assessed.    Wound Care: was done: Removal of existing dressing    Visual inspection, re-Application of clean dressing, no sting and repositioned    Orders  Written    Supplies  floor stock and discussed with RN    Discussed plan of care with Family and Nurse           Assessment:     Pressure Injury located on coccyx: Stage 2 present on admission    Status: wound  initial assessment, stable but pt is at high risk for breakdown due to many co-morbidities; low Rodrigo and Albumen,     Family is very supportive         Plan:     Nursing to notify the Provider(s) and re-consult the WOC Nurse if wound(s) deteriorate(s)or if the wound care plan needs reevaluation.    Plan for wound care to wound located on coccyx: Every 3 days    1. Wipe clean with wound spray or NS, pat dry    2. May use No sting to periwound to adhere the Mepilex 4x4; date apply like abiola to cleft    3. Pressure Injury Prevention: turn side to side Q 1-2hrs; float heels off pillow for each leg; keep HOB < 30 degrees unless eating; manage incontinence per POC    WOC Nurse will return: weekly

## 2019-03-12 NOTE — PROGRESS NOTES
[facility]    Hospitalist Progress Note  Name: Josafat Matson    MRN: [id number]  Provider: Madeline Yang MD  Date of Service: 03/12/2019    Assessment & Plan   Summary of Stay: Josafat Matson is a 78 year old male who was admitted on 3/10/2019 for altered mental status.  Patient with past medical history significant for Alzheimer's with Lewy body dementia, Parkinson's disease, coronary artery disease status post MI, chronic anemia, type 2 diabetes mellitus, abdominal aortic stent graft repair, hypertension, paroxysmal atrial fibrillation and hyperlipidemia.    Clinically improved on IV antibiotics.  Mental status is much improved per wife.  Concern for increased swelling of right upper extremity.  Requested Doppler to rule out DVT      Altered mental status secondary to acute encephalopathy  --Suspected secondary to probable recurrent right hand cellulitis and dehydration  --Clinically has improved  --CT head negative  --UA negative  --Started on IV ceftriaxone will continue for now  --Patient taking p.o. 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  Will hold as patient could not take orally due to mental status change    Hyperlipidemia  --Prior to admission on statins on hold for now    Diabetes mellitus type 2  --Prior to admission on metformin 500 twice daily.  N.p.o. for now  --On sliding scale insulin    Chronic anemia without evidence of blood loss    Hypertension  --Not on any meds prior to admission    Paroxysmal atrial fibrillation  --Currently in sinus rhythm  --Not on any anticoagulation        DVT Prophylaxis: Pneumatic Compression Devices  Code Status: Full Code    Disposition: Expected discharge 1-2 days if patient continues to improve.    Interval History   Reviewed chart.   Patient much more awake and communicative today.  Expressed no chest pain no shortness of breath no lightheadedness no dizziness.  Wife noted that patient is much more awake and oriented.  She did complain of increased swelling of right upper extremity.  Unable to get meaningful review of system as patient has dementia and would not participate.    -Data reviewed today: I reviewed all new labs and imaging reports over the last 24 hours. I personally reviewed no images or EKG's today.    Physical Exam   Temp: 98.3  F (36.8  C) Temp src: Axillary BP: 151/70 Pulse: 108 Heart Rate: 66 Resp: 16 SpO2: 95 % O2 Device: None (Room air)    Vitals:    03/10/19 1357 03/10/19 1900   Weight: 79.8 kg (176 lb) 76.4 kg (168 lb 8 oz)     Vital Signs with Ranges  Temp:  [97.6  F (36.4  C)-99.5  F (37.5  C)] 98.3  F (36.8  C)  Pulse:  [108] 108  Heart Rate:  [66-98] 66  Resp:  [16-20] 16  BP: (135-151)/(56-74) 151/70  SpO2:  [95 %-97 %] 95 %  I/O last 3 completed shifts:  In: 2747 [P.O.:250; I.V.:2497]  Out: -       GEN: Sleeping, NAD.  HEENT:  Normocephalic/atraumatic, no scleral icterus, no nasal discharge, mouth moist.  CV:  Regular rate and rhythm, no murmur or JVD.  S1 + S2 noted, no S3 or S4.  LUNGS:  Clear to auscultation bilaterally without rales/rhonchi/wheezing/retractions.  Symmetric chest rise on inhalation noted.  ABD:  Active bowel sounds, soft, non-tender/non-distended.  No rebound/guarding/rigidity.  EXT:  No edema.  No cyanosis.  Right hand swelling slightly worse than yesterday.  SKIN:  Dry to touch, erythema and right hand swelling    Medications     sodium chloride 100 mL/hr at 03/12/19 0957       aspirin  81 mg Oral Daily     atorvastatin  80 mg Oral Daily     carbidopa-levodopa  2 tablet Oral TID     cefTRIAXone  1 g Intravenous Q24H     donepezil  10 mg Oral Daily     enoxaparin  40 mg Subcutaneous Q24H     insulin aspart  1-6 Units Subcutaneous Q4H     melatonin  15 mg Oral At Bedtime     memantine  10 mg Oral  BID     pramipexole  0.375 mg Oral At Bedtime     traZODone  50 mg Oral At Bedtime     Data     Recent Labs   Lab 03/12/19  0832 03/11/19  1403 03/11/19  0727 03/10/19  1402   WBC 3.5*  --  4.7 4.9   HGB 10.1*  --  11.4* 8.9*   HCT 30.6*  --  34.4* 27.5*   MCV 91  --  92 93   * 132* 150 118*     Recent Labs   Lab 03/12/19  0832 03/11/19  1403 03/11/19  0727 03/10/19  1402     --  143 143   POTASSIUM 3.5  --  3.7 3.4   CHLORIDE 112*  --  111* 111*   CO2 27  --  27 26   ANIONGAP 4  --  5 6   *  --  148* 188*   BUN 12  --  14 17   CR 0.92 0.96 0.97 0.96   GFRESTIMATED 80 75 75 76   GFRESTBLACK >90 87 87 88   CAMILLA 7.8*  --  8.0* 7.7*     Recent Labs   Lab 03/10/19  1525 03/10/19  1401   CULT No growth after 2 days No growth after 2 days     No results for input(s): NTBNPI, NTBNP in the last 168 hours.  Recent Labs   Lab 03/10/19  1402   AST 34   ALT 9   ALKPHOS 87   BILITOTAL 1.4*     No results for input(s): INR in the last 168 hours.  Recent Labs   Lab 03/10/19  1408   LACT 1.5     Recent Labs   Lab 03/10/19  1526   COLOR Yellow   APPEARANCE Clear   URINEGLC Negative   URINEBILI Negative   URINEKETONE 5*   SG 1.020   UBLD Negative   URINEPH 5.0   PROTEIN Negative   NITRITE Negative   LEUKEST Negative   RBCU <1   WBCU 1       No results found for this or any previous visit (from the past 24 hour(s)).

## 2019-03-12 NOTE — CONSULTS
Pt admitted with acute encephalopathy and right hand cellulitis.  MOD RISK.  Pt has a history of Parkinson's and Alzheimers.  CORRINE leading.  Will give hand off to RN Clinic CTS at time of discharge.  Terrie RN CTS [id number]

## 2019-03-12 NOTE — PLAN OF CARE
Discharge Planner SLP   Patient plan for discharge: Wife reported that she is not able to manage pt at home    Current status: SLP: Pt presents with mild to moderate dysphagia on clinical swallow evaluation. Pt alert in a chair for breakfast meal with wife present. Wife provided history information. History of Video Swallow Study in 2018 with penetration of thin liquids that cleared with effective cough. Wife reported no difficulty with mech/dental soft diet (pt does not like to wear dentures) and thin liquids at home. Recent difficulty spitting out medications reported, however, pt has been able to take meds crushed in applesauce well. No history of aspiration pneumonia per wife report.    1:1 assist required for feeding mech soft textures and thin liquids. Prolonged oral phase and delayed initiation/reduced oral awareness with solids. Mild oral residue following that cleared with liquid wash. Pt unable to use straw due to reduced labial seal. Suspect premature spillage with double swallows and coughing noted with liquids by cup, however, improved tolerance of thin liquids by teaspoon with no overt s/sx of aspiration. Wife verbalized agreement with recommended: continue mech/dental soft diet and thin liquids by teaspoon with 1:1 assist when pt is upright in a chair for meals. Strict use of swallow precautions including: small bites and sips, slow rate, sip of liquid after EVERY bite of food, verify every swallow and hold PO if pt is fatigued. Wife in agreemen to consider adding nutritional supplements for reported weight loss. Crush meds and place in applesauce. Could consider repeat VFSS pending diet tolerance with use of swallow strategies.    Consider OT/PT consults. Wife reported that pt was walking with assist at home and was able to feed himself with min assist before hospitalization.     Barriers to return to prior living situation: Level of assist  Recommendations for discharge: TCU  Rationale for  recommendations: Continue ST for swallowing goals       Entered by: Rohini Townsend 03/12/2019 11:16 AM       Dorsal Nasal Flap Text: The defect edges were debeveled with a #15 scalpel blade.  Given the location of the defect and the proximity to free margins a dorsal nasal flap was deemed most appropriate.  Using a sterile surgical marker, an appropriate dorsal nasal flap was drawn around the defect.    The area thus outlined was incised deep to adipose tissue with a #15 scalpel blade.  The skin margins were undermined to an appropriate distance in all directions utilizing iris scissors.

## 2019-03-12 NOTE — PLAN OF CARE
Ambulatory Status:  Pt up A2 with lift. NOOB this shift. Repositioned Q2H.  VS:  stable; afebrile.  Pain:  denies.  Resp: LS diminished on RA.  GI:  denies nausea.  fair appetite and on dental soft diet.  BS active.  Passing flatus.  Last BM 3/12.  :  incont.  Skin:  dressing on coccyx CDI.  Tx:  rocephin.  Labs:  ; 206.  Consults:  speech/WOC  Disposition:  TBD.

## 2019-03-12 NOTE — PROGRESS NOTES
"   03/12/19 1120   General Information   Onset Date 03/10/19   Start of Care Date 03/12/19   Referring Physician Madeline Yang MD   Patient Profile Review/OT: Additional Occupational Profile Info See Profile for full history and prior level of function   Patient/Family Goals Statement did not state   Swallowing Evaluation Bedside swallow evaluation   Behaviorial Observations Confused   Mode of current nutrition Oral diet   Comments \"Patient is a 78-year-old male with a complex history including Alzheimer's and Lewy body dementia, Parkinson's disease, coronary artery disease status post MI, chronic anemia, type 2 diabetes, abdominal aortic stent graft repair, hypertension, paroxysmal atrial fibrillation, and hyperlipidemia who presents here with altered mental status. 1.Acute encephalopathy: Suspect secondary to probable recurrent right hand cellulitis and dehydration.\" No SLP notes. VFSS in 05/2018 at outside facility.    Clinical Swallow Evaluation   Oral Musculature unable to assess due to poor participation/comprehension   Dentition rarely or never uses dentures to eat   General Therapy Interventions   Planned Therapy Interventions Dysphagia Treatment   Dysphagia treatment Modified diet education;Instruction of safe swallow strategies   Swallow Eval: Clinical Impressions   Skilled Criteria for Therapy Intervention Skilled criteria met.  Treatment indicated.   Functional Assessment Scale (FAS) 4   Treatment Diagnosis dysphagia   Diet texture recommendations Thin liquids  (mech soft)   Recommended Feeding/Eating Techniques alternate between small bites and sips of food/liquid;check mouth frequently for oral residue/pocketing;hard swallow w/ each bite or sip;maintain upright posture during/after eating for 30 mins;no straws;small sips/bites   Demonstrates Need for Referral to Another Service dietitian   Therapy Frequency 5 times/wk   Predicted Duration of Therapy Intervention (days/wks) 1 week   Anticipated " Discharge Disposition extended care facility   Risks and Benefits of Treatment have been explained. Yes   Patient, family and/or staff in agreement with Plan of Care Yes   Clinical Impression Comments SLP: Pt presents with mild to moderate dysphagia on clinical swallow evaluation. Pt alert in a chair for breakfast meal with wife present. Wife provided history information. History of Video Swallow Study in 2018 with penetration of thin liquids that cleared with effective cough. Wife reported no difficulty with mech/dental soft diet (pt does not like to wear dentures) and thin liquids at home. Recent difficulty spitting out medications reported, however, pt has been able to take meds crushed in applesauce well. No history of aspiration pneumonia per wife report. :1 assist required for feeding mech soft textures and thin liquids. Prolonged oral phase and delayed initiation/reduced oral awareness with solids. Mild oral residue following that cleared with liquid wash. Pt unable to use straw due to reduced labial seal. Suspect premature spillage with double swallows and coughing noted with liquids by cup, however, improved tolerance of thin liquids by teaspoon with no overt s/sx of aspiration. Wife verbalized agreement with recommended: continue mech/dental soft diet and thin liquids by teaspoon with 1:1 assist when pt is upright in a chair for meals. Strict use of swallow precautions including: small bites and sips, slow rate, sip of liquid after EVERY bite of food, verify every swallow and hold PO if pt is fatigued. Wife in agreemen to consider adding nutritional supplements for reported weight loss. Crush meds and place in applesauce. Could consider repeat VFSS pending diet tolerance with use of swallow strategies. onsider OT/PT consults. Wife reported that pt was walking with assist at home and was able to feed himself with min assist before hospitalization.    Total Evaluation Time   Total Evaluation Time (Minutes)  20

## 2019-03-12 NOTE — PROGRESS NOTES
Patient alert & oriented x3; disoriented to time. Pt's VSS; , 194, & 250. Right hand, wrist, and arm edematous; MD ordered U/S with results pending. Speech consult recommending the continuation of mechanical soft foods and crushing of medications that are able. MD discontinued continuous IV fluids with patient continuing to progress with feeding/drinking. Pressure ulcer on bottom pink and blanchable with foam on. Patient sat in chair for breakfast meal. Discharge pending.

## 2019-03-12 NOTE — PLAN OF CARE
Admitted for AMS.  Appears to be at baseline. Needs some reorienting to time and place but alert, responds verbally, follows commands.  Turned and repositioned Q2 hours   Mech soft diet, accepted sips of fluids over night  1 small incont BM   Q4 BG   ml/hr, IV Rocephin  Mepilex to sacrum, Right hand slightly edematous with some redness to palm  Discharge possibly to LTC TBD- placement needed

## 2019-03-13 NOTE — PLAN OF CARE
Pt confused, alert to self only.  VSS. Denies pain.  LS dim.  Incontinent of B&B, repo Q2hrs   R & L palm appear reddened.  RUE trace edema noted.  Penile area reddened.  Up w/mechanical lift.   BG Q4hrs 150/161.  IVF infusing.  Tx: Rocephin  Dispo: TBD.

## 2019-03-13 NOTE — PROGRESS NOTES
[facility]    Hospitalist Progress Note  Name: Josafat Matson    MRN: [id number]  Provider: Madeline Yang MD  Date of Service: 03/13/2019    Assessment & Plan   Summary of Stay: Josafat Matson is a 78 year old male who was admitted on 3/10/2019 for altered mental status.  Patient with past medical history significant for Alzheimer's with Lewy body dementia, Parkinson's disease, coronary artery disease status post MI, chronic anemia, type 2 diabetes mellitus, abdominal aortic stent graft repair, hypertension, paroxysmal atrial fibrillation and hyperlipidemia.    Clinically improved on IV antibiotics.  Mental status is much improved per wife.  Concern for increased swelling of right upper extremity.  Requested Doppler to rule out DVT      Altered mental status secondary to acute encephalopathy  --Suspected secondary to probable recurrent right hand cellulitis and dehydration  --Clinically has improved  --CT head negative  --UA negative  --Started on IV ceftriaxone will switch to oral Keflex today.  --Patient taking p.o. 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  Will hold as patient could not take orally due to mental status change    Hyperlipidemia  --Prior to admission on statins on hold for now    Diabetes mellitus type 2  --Resumed prior to admission on metformin 500 twice daily.   --On sliding scale insulin    Chronic anemia without evidence of blood loss    Hypertension  --Not on any meds prior to admission    Paroxysmal atrial fibrillation  --Currently in sinus rhythm  --Not on any anticoagulation        DVT Prophylaxis: Pneumatic Compression Devices  Code Status: Full Code    Disposition: Expected discharge tomorrow if continues to improve    Interval  History   Reviewed chart.  Patient much more awake and communicative today.  Expressed no chest pain no shortness of breath no lightheadedness no dizziness.      -Data reviewed today: I reviewed all new labs and imaging reports over the last 24 hours. I personally reviewed no images or EKG's today.    Physical Exam   Temp: 98.2  F (36.8  C) Temp src: Axillary BP: 153/64   Heart Rate: 66 Resp: 16 SpO2: 95 % O2 Device: None (Room air)    Vitals:    03/10/19 1357 03/10/19 1900   Weight: 79.8 kg (176 lb) 76.4 kg (168 lb 8 oz)     Vital Signs with Ranges  Temp:  [97.3  F (36.3  C)-98.2  F (36.8  C)] 98.2  F (36.8  C)  Heart Rate:  [64-71] 66  Resp:  [16] 16  BP: (128-153)/(55-64) 153/64  SpO2:  [95 %-96 %] 95 %  I/O last 3 completed shifts:  In: 2127 [P.O.:120; I.V.:2007]  Out: -       GEN: Sleeping, NAD.  HEENT:  Normocephalic/atraumatic, no scleral icterus, no nasal discharge, mouth moist.  CV:  Regular rate and rhythm, no murmur or JVD.  S1 + S2 noted, no S3 or S4.  LUNGS:  Clear to auscultation bilaterally without rales/rhonchi/wheezing/retractions.  Symmetric chest rise on inhalation noted.  ABD:  Active bowel sounds, soft, non-tender/non-distended.  No rebound/guarding/rigidity.  EXT:  No edema.  No cyanosis.  Right hand swelling slightly worse than yesterday.  SKIN:  Dry to touch, erythema and right hand swelling    Medications     sodium chloride 100 mL/hr at 03/13/19 1058       aspirin  81 mg Oral Daily     atorvastatin  80 mg Oral Daily     carbidopa-levodopa  2 tablet Oral TID     cephaleXin  500 mg Oral 4x Daily     donepezil  10 mg Oral Daily     enoxaparin  40 mg Subcutaneous Q24H     insulin aspart  1-6 Units Subcutaneous Q4H     melatonin  15 mg Oral At Bedtime     memantine  10 mg Oral BID     metFORMIN  500 mg Oral BID w/meals     pramipexole  0.375 mg Oral At Bedtime     traZODone  50 mg Oral At Bedtime     Data     Recent Labs   Lab 03/13/19  0759 03/12/19  0832 03/11/19  1403 03/11/19  0727   WBC  3.8* 3.5*  --  4.7   HGB 10.4* 10.1*  --  11.4*   HCT 31.8* 30.6*  --  34.4*   MCV 91 91  --  92   * 125* 132* 150     Recent Labs   Lab 03/13/19  0759 03/12/19  0832 03/11/19  1403 03/11/19  0727    143  --  143   POTASSIUM 3.6 3.5  --  3.7   CHLORIDE 109 112*  --  111*   CO2 28 27  --  27   ANIONGAP 3 4  --  5   * 139*  --  148*   BUN 9 12  --  14   CR 0.82 0.92 0.96 0.97   GFRESTIMATED 85 80 75 75   GFRESTBLACK >90 >90 87 87   CAMILLA 7.8* 7.8*  --  8.0*     Recent Labs   Lab 03/10/19  1525 03/10/19  1401   CULT No growth after 3 days No growth after 3 days     No results for input(s): NTBNPI, NTBNP in the last 168 hours.  Recent Labs   Lab 03/10/19  1402   AST 34   ALT 9   ALKPHOS 87   BILITOTAL 1.4*     No results for input(s): INR in the last 168 hours.  Recent Labs   Lab 03/10/19  1408   LACT 1.5     Recent Labs   Lab 03/10/19  1526   COLOR Yellow   APPEARANCE Clear   URINEGLC Negative   URINEBILI Negative   URINEKETONE 5*   SG 1.020   UBLD Negative   URINEPH 5.0   PROTEIN Negative   NITRITE Negative   LEUKEST Negative   RBCU <1   WBCU 1       No results found for this or any previous visit (from the past 24 hour(s)).

## 2019-03-13 NOTE — PROGRESS NOTES
Discharge Planner   Discharge Plans in progress: Met with wife to update that pt has been accepted for LTC bed with TCU orders for tomorrow  Barriers to discharge plan: Family are aware of possible deposit once Medicare has stopped paying as pt is MA pending.    Follow up plan:      03/13/19 1400   [facility] [phone], Fax: [phone]   PAS Number [id number]  (03/13/19)       [place] are [email or web address] at 7:30. They understand this is really the only option we have as they wish to keep pt close to home    \Wife is aware of private pay for [facility] transport and will let SW know tomorrow if pt will need transport of if she can find family to assist with transport.    Entered by: Corinne C. White 03/13/2019 2:09 PM     March 13th, 2019 at 02:08:16 PM CDT. The confirmation number is [id number]

## 2019-03-14 NOTE — PROGRESS NOTES
Patient going to LTC at [facility] and Dr Yang filled prescription for keflex liquid and we don't fill prescriptions for LTC as they get filled at the facility they go to on discharge.  Called and spoke with [facility] and they said we could send the filled prescription with patient at discharge this evening and they would accept this.

## 2019-03-14 NOTE — PLAN OF CARE
Discharge Planner SLP   Patient plan for discharge: did not state  Current status: Ongoing swallow tx completed with snack consisting of puree solids, thin liquids - tsp, cup and straw assessed. RN denied any concerns with swallowing when asked. Pt reports he coughs but reported all day and all night, not specifically with eating/drinking. Pt able to self-feed with mod assist, difficulty coordinating at times. Labial seal intact with cup and straw sips. Suspect premature spillage. Double swallows noted consistently which may be indicative of reduced pharyngeal clearance. Throat clear x1 noted with solids when not alternating with liquids and x3 delayed throat clears with thin liquids via straw. Pt appears appropriate for drinking via cup vs just tsp.     Continue mech/dental soft diet and thin liquids by teaspoon or cup (NO STRAWS) with 1:1 supervision/assist as needed when pt is upright in a chair for meals. Strict use of swallow precautions including: small bites and sips, slow rate, sip of liquid after EVERY bite of food, verify every swallow and hold PO if pt is fatigued. Crush meds and place in applesauce.     Barriers to return to prior living situation: Level of assist  Recommendations for discharge: TCU  Rationale for recommendations: Continue ST for swallowing goals       Entered by: Honey Dillard 03/14/2019 10:40 AM

## 2019-03-14 NOTE — DISCHARGE SUMMARY
[facility]  Discharge Summary  Hospitalist      Date of Admission:  3/10/2019  Date of Discharge:  3/14/2019  Provider:  Madeline Yang MD  Date of Service (when I last saw the patient): 03/14/19      Primary Provider: Georgiana Bansal          Discharge Diagnosis:   Discharge Diagnoses   Right arm cellulitis  Altered mental status secondary to acute encephalopathy secondary to  Infection    Other medical issues:  Past Medical History:   Diagnosis Date     A-fib (H)      Alzheimer's disease      Coronary artery disease      Diabetes 1.5, managed as type 2 (H)      Heart attack (H) 2002     Hyperlipidemia      Hypertension      Non-allergic rhinitis      Seborrheic dermatitis      Sleep apnea      Stented coronary artery           History of Present Illness   Josafat Matson is an 78 year old male who presented with altered mental status.  Please see the admission history and physical for full details.    Hospital Course     Josafat Matson was admitted on 3/10/2019.  He is 78 year old male who was admitted on 3/10/2019 for altered mental status.  Patient with past medical history significant for Alzheimer's with Lewy body dementia, Parkinson's disease, coronary artery disease status post MI, chronic anemia, type 2 diabetes mellitus, abdominal aortic stent graft repair, hypertension, paroxysmal atrial fibrillation and hyperlipidemia.     Clinically improved on IV antibiotics.  Mental status is much improved per wife and at baseline prior to discharge.    The following problems were addressed during his hospitalization:    Altered mental status secondary to acute encephalopathy  --Suspected secondary to probable recurrent right hand cellulitis and dehydration  --Clinically has improved  --CT head negative  --UA negative  --Started on IV ceftriaxone will switch to oral Keflex prior to discharge  --Patient taking p.o. we will discontinue fluids        Right hand cellulitis  --IV ceftriaxone initially  with clinical improvement now switching to oral Keflex  --Doppler negative for DVT        History of examiner's dementia along with Lewy body dementia  --Prior to admission Sinemet, Aricept and Namenda.  Medications were resumed once he was able to take p.o.    Hyperlipidemia  --Prior to admission on statins was resumed once taking p.o.     Diabetes mellitus type 2  --Resumed prior to admission on metformin 500 twice daily.   --On sliding scale insulin  --Monitor fingerstick before meals and at bedtime     Chronic anemia without evidence of blood loss     Hypertension  --Not on any meds prior to admission     Paroxysmal atrial fibrillation  --Currently in sinus rhythm  --Not on any anticoagulation                Significant Results and Procedures   As above    Pending Results   Unresulted Labs Ordered in the Past 30 Days of this Admission     Date and Time Order Name Status Description    3/10/2019 1450 Blood culture Preliminary     3/10/2019 1448 Blood culture Preliminary           Code Status   Full Code       Primary Care Physician   MARC CASTELAN    Physical Exam   Temp: 97  F (36.1  C) Temp src: Axillary BP: 164/70   Heart Rate: 68 Resp: 16 SpO2: 96 % O2 Device: None (Room air)    Vitals:    03/10/19 1357 03/10/19 1900   Weight: 79.8 kg (176 lb) 76.4 kg (168 lb 8 oz)     Vital Signs with Ranges  Temp:  [97  F (36.1  C)-97.8  F (36.6  C)] 97  F (36.1  C)  Heart Rate:  [68-81] 68  Resp:  [16] 16  BP: (134-164)/(61-70) 164/70  SpO2:  [95 %-96 %] 96 %  I/O last 3 completed shifts:  In: 3344 [P.O.:920; I.V.:2424]  Out: -     GEN: Sleeping, NAD.  HEENT:  Normocephalic/atraumatic, no scleral icterus, no nasal discharge, mouth moist.  CV:  Regular rate and rhythm, no murmur or JVD.  S1 + S2 noted, no S3 or S4.  LUNGS:  Clear to auscultation bilaterally without rales/rhonchi/wheezing/retractions.  Symmetric chest rise on inhalation noted.  ABD:  Active bowel sounds, soft, non-tender/non-distended.  No  rebound/guarding/rigidity.  EXT:  No edema.  No cyanosis.  Right hand swelling slightly worse than yesterday.  SKIN:  Dry to touch, erythema and right hand swelling improved               Discharge Disposition   Discharged to long-term care facility    Consultations This Hospital Stay   ADVANCE DIRECTIVE IP CONSULT  WOUND OSTOMY CONTINENCE NURSE  IP CONSULT  SOCIAL WORK IP CONSULT  CARE COORDINATOR IP CONSULT  SWALLOW EVAL SPEECH PATH AT BEDSIDE IP CONSULT  PHYSICAL THERAPY ADULT IP CONSULT    Time Spent on this Encounter   I, Madeline Yang, personally saw the patient today and spent greater than 30 minutes discharging this patient.    Discharge Orders      General info for SNF    Length of Stay Estimate: Long Term Care  Condition at Discharge: Stable  Level of care:skilled   Rehabilitation Potential: Fair  Admission H&P remains valid and up-to-date: Yes  Recent Chemotherapy: N/A  Use Nursing Home Standing Orders: Yes     Mantoux instructions    Give two-step Mantoux (PPD) Per Facility Policy Yes     Reason for your hospital stay    Please refer to discharge summary briefly patient admitted for altered mental status and right arm cellulitis.  Status improved to baseline on antibiotics.     Glucose monitor nursing POCT    Before meals and at bedtime     Daily weights    Call Provider for weight gain of more than 2 pounds per day or 5 pounds per week.     Activity - Up with nursing assistance     Full Code     Physical Therapy Adult Consult    Evaluate and treat as clinically indicated.    Reason: Deconditioning     Fall precautions     Advance Diet as Tolerated    Follow this diet upon discharge: Orders Placed This Encounter      Room Service      Mechanical/Dental Soft Diet     Discharge Medications   Current Discharge Medication List      START taking these medications    Details   cephALEXin (KEFLEX) 250 MG/5ML suspension Take 10 mLs (500 mg) by mouth 4 times daily for 5 days  Qty: 200 mL, Refills: 0     Associated Diagnoses: Cellulitis of right hand         CONTINUE these medications which have NOT CHANGED    Details   aspirin (ASA) 81 MG EC tablet Take 81 mg by mouth daily      Atorvastatin Calcium (LIPITOR PO) Take 80 mg by mouth daily      !! carbidopa-levodopa (SINEMET)  MG per tablet Take 2 tablets by mouth 3 times daily 8am, 12pm, 4pm      !! carbidopa-levodopa (SINEMET)  MG tablet Take 1 tablet by mouth daily as needed (between 2am and 6 am if restless)      donepezil (ARICEPT) 10 MG tablet Take 10 mg by mouth daily       Melatonin 10 MG TABS tablet Take 15 mg by mouth At Bedtime       Memantine HCl (NAMENDA PO) Take 10 mg by mouth 2 times daily       metFORMIN (GLUCOPHAGE) 500 MG tablet Take 500 mg by mouth 2 times daily (with meals)      multivitamin, therapeutic with minerals (MULTI-VITAMIN) TABS Take 1 tablet by mouth daily      nitroGLYcerin (NITROSTAT) 0.4 MG sublingual tablet Place 0.4 mg under the tongue every 5 minutes as needed for chest pain For chest pain place 1 tablet under the tongue every 5 minutes for 3 doses. If symptoms persist 5 minutes after 1st dose call 911.      pramipexole (MIRAPEX) 0.75 MG tablet Take 0.375 mg by mouth At Bedtime       TRAZODONE HCL PO Take 50 mg by mouth At Bedtime        !! - Potential duplicate medications found. Please discuss with provider.        Allergies   Allergies   Allergen Reactions     Tamsulosin      Other reaction(s): Dizziness     Terazosin      Other reaction(s): Dizziness     Data   Most Recent 3 CBC's:  Recent Labs   Lab Test 03/14/19  0726 03/13/19  0759 03/12/19  0832  03/11/19  0727   WBC  --  3.8* 3.5*  --  4.7   HGB  --  10.4* 10.1*  --  11.4*   MCV  --  91 91  --  92   * 123* 125*   < > 150    < > = values in this interval not displayed.      Most Recent 3 BMP's:  Recent Labs   Lab Test 03/13/19  0759 03/12/19  0832 03/11/19  1403 03/11/19  0727    143  --  143   POTASSIUM 3.6 3.5  --  3.7   CHLORIDE 109 112*  --   111*   CO2 28 27  --  27   BUN 9 12  --  14   CR 0.82 0.92 0.96 0.97   ANIONGAP 3 4  --  5   CAMILLA 7.8* 7.8*  --  8.0*   * 139*  --  148*     Most Recent 2 LFT's:  Recent Labs   Lab Test 03/10/19  1402   AST 34   ALT 9   ALKPHOS 87   BILITOTAL 1.4*     Most Recent INR's and Anticoagulation Dosing History:  Anticoagulation Dose History     There is no flowsheet data to display.        Most Recent 3 Troponin's:  Recent Labs   Lab Test 03/10/19  1402 01/29/19  1051   TROPI 0.033 0.029     Most Recent Cholesterol Panel:No lab results found.  Most Recent 6 Bacteria Isolates From Any Culture (See EPIC Reports for Culture Details):  Recent Labs   Lab Test 03/10/19  1525 03/10/19  1401   CULT No growth after 4 days No growth after 4 days     Most Recent TSH, T4 and A1c Labs:  Recent Labs   Lab Test 03/10/19  1402   A1C 6.9*     Results for orders placed or performed during the hospital encounter of 03/10/19   CT Head w/o Contrast    Narrative    Clinical History: Altered level of consciousness (LOC), unexplained    Technique: Noncontrast axial CT images of the head were obtained. This  CT Scan used dose modulation, iterative reconstruction, and/or weight  based dosing when appropriate to reduce radiation dose to as low as  reasonably achievable.    Comparison: Brain MRI 1/29/2019    Findings: There is mild cerebral volume loss. Patchy low attenuation  of the white matter is nonspecific, though most likely due to mild  small vessel ischemic disease. No evidence of hemorrhage, mass or  recent infarction. The cerebellum and brainstem are unremarkable. The  globes and orbits are unremarkable.  The imaged portions of the  paranasal sinuses and mastoid air cells are clear.  The skull base and  calvaria are intact.        Impression    Impression:   1.  No acute findings.  2.  Nonspecific white matter changes are most likely due to mild small  vessel ischemic disease.      KELSI KERR MD   XR Hand Right G/E 3 Views     Narrative    RIGHT HAND THREE VIEWS   3/10/2019 3:44 PM     HISTORY: Right hand swelling.    COMPARISON: 2/11/2019      Impression    IMPRESSION: Soft tissue swelling over the dorsum of the metacarpal  heads and around the wrist. Widening of the scapholunate junction and  marked narrowing of the radiocarpal joint, unchanged. No evidence of  fracture.    ARNOLDO JOSEPH MD   CT Abdomen Pelvis w Contrast    Narrative    CT ABDOMEN AND PELVIS WITH CONTRAST 3/10/2019 4:02 PM     HISTORY: Abdominal pain, unspecified.    TECHNIQUE: Axial images from the lung bases to the symphysis are  performed with additional coronal reformatted images. 89 mL of Isovue  370 are given intravenously.  Radiation dose for this scan was reduced  using automated exposure control, adjustment of the mA and/or kV  according to patient size, or iterative reconstruction technique.    FINDINGS:     The lung bases are clear.    Abdomen: Respiratory motion artifact is present through the upper  abdomen. Calcified gallstone is noted in the gallbladder. Gallbladder  is otherwise within normal limits. The liver, spleen, pancreas,  adrenal glands and kidneys are unremarkable. No hydronephrosis or  renal calculi. Right renal cortical scarring is noted. Aorta  demonstrates extensive calcified plaque and is status post abdominal  aortic aneurysm stent graft repair. No bowel obstruction or  diverticulitis. Probable constipation with rectal fecal impaction.  Appendix appears normal. Mild distal gastric wall thickening is noted  possibly due to motion artifact versus inflammation in the distal  stomach. No enlarged lymph nodes. No free intraperitoneal air.    Pelvis: The bladder is unremarkable. Prostate gland is mildly  enlarged. Rectum is distended with fecal debris but is otherwise  unremarkable. No enlarged pelvic or inguinal lymph nodes. No free  pelvic fluid. Degenerative spine changes are present.      Impression    IMPRESSION:  1. Probable rectal fecal  impaction with colonic constipation. No bowel  obstruction, diverticulitis or evidence of appendicitis.  2. Patient is status post abdominal aortic stent graft repair. Stent  graft appears patent.  3. Questionable distal gastric wall thickening possibly reflecting  gastritis. Correlate with patient's clinical symptoms.  4. Cholelithiasis. No associated gallbladder inflammation is evident.    DAVION DE ANDA MD   XR Chest 1 View    Narrative    CHEST ONE VIEW  3/10/2019 3:50 PM     HISTORY: altered mental status    COMPARISON: None.      Impression    IMPRESSION: Normal.    ARNOLDO JOSEPH MD   US Upper Extremity Venous Duplex Right    Narrative    US UPPER EXTREMITY VENOUS DUPLEX RIGHT  3/12/2019 1:59 PM     HISTORY: rule out DVT, rt arm swelling    COMPARISON: None.    TECHNIQUE: Examination of the upper extremity veins was performed with  graded compression and 2-D ultrasound and color doppler spectral  waveform analysis.     FINDINGS:  There is no evidence of thrombosis of the axillary,  brachial, basilic or cephalic veins.  The veins in the forearm show no  evidence of thrombosis.      Impression    IMPRESSION: Normal upper extremity venous ultrasound.    MCKINLEY HILARIO MD           Disclaimer: This note consists of symbols derived from keyboarding, dictation and/or voice recognition software. As a result, there may be errors in the script that have gone undetected. Please consider this when interpreting information found in this chart.

## 2019-03-14 NOTE — PROGRESS NOTES
Wife is agreeable to [facility] L[facility].  I called admissions and left a vm.  Wife wants transport set up and is aware of private cost.  [facility] is set up for 1700.  MD paged for orders.

## 2019-03-14 NOTE — PROGRESS NOTES
AVS printed and reviewed with wife. Envelope with information printed for long term care facility and Keflex sent with pt at discharge. Pt pad changed and dressed prior to discharge.

## 2019-03-14 NOTE — PLAN OF CARE
Patient afebrile, oriented to self and place but forgetful, incontinent of urine but was able to have BM on BSC and up with lift and Ax2, repositioned q2hrs, lower dentures in place, feed by staff and on dental/mechanical diet, plan is to discharge to LTC at 5pm [facility] transport, prescription for keflex liquids filled and will be sent with patient to facility it is currently in the refrigerator here, takes his medications crushed with pudding

## 2019-03-14 NOTE — PLAN OF CARE
Pt confused, alert to self only.  VSS. Denies pain.  LS dim.  Incontinent of B&B, repo Q2hrs   RUE trace edema noted.  Penile area reddened.  Pressure injury to coccyx area, dressing changed x1.   Up w/mechanical lift.   Mech/soft diet, assist w/feeding.  BG Q4hrs 203/169/  IVF infusing.  Tx: Keflex  Dispo: Possible discharge today.

## 2019-03-15 NOTE — PLAN OF CARE
Speech Language Therapy Discharge Summary    Reason for therapy discharge:    Discharged to long term care facility.    Progress towards therapy goal(s). See goals on Care Plan in [facility] electronic health record for goal details.  Goals partially met.  Barriers to achieving goals:   discharge from facility.    Therapy recommendation(s):    Continued therapy is recommended.  Rationale/Recommendations:  below baseline, monitor tolerance of PO..

## 2019-03-15 NOTE — PROGRESS NOTES
Transition Communication Hand-off for Care Transitions to Next Level of Care Provider    Name: Josafat Matson  : 1940  MRN #: [id number]  Primary Care Provider: MARC BANSAL  Primary Care MD Name: Marc Bansal  Primary Clinic: [facility] [address] [place] [address]  Primary Care Clinic Name: [facility]   Reason for Hospitalization:  Generalized muscle weakness [M62.81]  Cellulitis of right hand [L03.113]  Constipation, unspecified constipation type [K59.00]  Altered mental status, unspecified altered mental status type [R41.82]  Anemia, unspecified type [D64.9]  Gastritis with hemorrhage, unspecified chronicity, unspecified gastritis type [K29.71]  Admit Date/Time: 3/10/2019  1:48 PM  Discharge Date: 3/14/19  Payor Source: Payor: MEDICARE / Plan: MEDICARE / Product Type: Medicare /     Readmission Assessment Measure (JONAS) Risk Score/category: Average           Reason for Communication Hand-off Referral: Fragility    Discharge Plan: [facility]       Concern for non-adherence with plan of care:   Yes, dementia  Discharge Needs Assessment:  Needs      Most Recent Value   Equipment Currently Used at Home  shower chair, cane, straight, wheelchair, manual   Home Care  [facility] [phone], Fax: [phone]   Skilled Nursing Facility  [facility] [phone], Fax: [phone]   PAS Number  [id number]]          Already enrolled in Tele-monitoring program and name of program:  NA  Follow-up specialty is recommended: No    Follow-up plan:  No future appointments.    Any outstanding tests or procedures:        Referrals     Future Labs/Procedures    Physical Therapy Adult Consult     Comments:    Evaluate and treat as clinically indicated.    Reason: Deconditioning    Speech Language Path Adult Consult     Comments:    Evaluate and treat as clinically indicated.    Reason:  dysphagia            Key Recommendations:  Pt admitted with  acute encephalopathy and right hand cellulitis.  MOD RISK.  Encephalopathy thought to be related to infection and patient was discharged on oral keflex. Pt has a history of Parkinson's and Alzheimers.  Pt was living with his wife and son and had [facility] RN/PT/OT/SW/HHA.  SW saw pt and patient discharged to LTC at [facility].      Kaley Knox & Sona Chris    AVS/Discharge Summary is the source of truth; this is a helpful guide for improved communication of patient story

## 2021-05-26 VITALS
RESPIRATION RATE: 24 BRPM | OXYGEN SATURATION: 98 % | SYSTOLIC BLOOD PRESSURE: 108 MMHG | HEART RATE: 108 BPM | DIASTOLIC BLOOD PRESSURE: 64 MMHG

## 2021-05-26 VITALS
HEART RATE: 112 BPM | SYSTOLIC BLOOD PRESSURE: 112 MMHG | DIASTOLIC BLOOD PRESSURE: 64 MMHG | RESPIRATION RATE: 20 BRPM | OXYGEN SATURATION: 98 %

## 2021-05-26 VITALS
HEART RATE: 88 BPM | OXYGEN SATURATION: 97 % | DIASTOLIC BLOOD PRESSURE: 68 MMHG | SYSTOLIC BLOOD PRESSURE: 128 MMHG | RESPIRATION RATE: 18 BRPM

## 2021-05-26 VITALS — RESPIRATION RATE: 18 BRPM

## 2021-05-26 VITALS
HEART RATE: 112 BPM | SYSTOLIC BLOOD PRESSURE: 108 MMHG | OXYGEN SATURATION: 96 % | DIASTOLIC BLOOD PRESSURE: 68 MMHG | RESPIRATION RATE: 16 BRPM

## 2021-05-26 VITALS
HEART RATE: 80 BPM | DIASTOLIC BLOOD PRESSURE: 48 MMHG | OXYGEN SATURATION: 98 % | SYSTOLIC BLOOD PRESSURE: 92 MMHG | RESPIRATION RATE: 20 BRPM

## 2021-05-26 VITALS
RESPIRATION RATE: 18 BRPM | HEART RATE: 88 BPM | SYSTOLIC BLOOD PRESSURE: 96 MMHG | DIASTOLIC BLOOD PRESSURE: 64 MMHG | OXYGEN SATURATION: 98 %

## 2021-05-26 VITALS
HEART RATE: 108 BPM | DIASTOLIC BLOOD PRESSURE: 53 MMHG | RESPIRATION RATE: 18 BRPM | OXYGEN SATURATION: 96 % | SYSTOLIC BLOOD PRESSURE: 96 MMHG

## 2021-05-26 VITALS
RESPIRATION RATE: 24 BRPM | SYSTOLIC BLOOD PRESSURE: 124 MMHG | OXYGEN SATURATION: 95 % | DIASTOLIC BLOOD PRESSURE: 50 MMHG | HEART RATE: 94 BPM

## 2021-05-26 VITALS
DIASTOLIC BLOOD PRESSURE: 52 MMHG | OXYGEN SATURATION: 95 % | RESPIRATION RATE: 20 BRPM | SYSTOLIC BLOOD PRESSURE: 98 MMHG | HEART RATE: 88 BPM

## 2021-05-26 VITALS
OXYGEN SATURATION: 97 % | SYSTOLIC BLOOD PRESSURE: 98 MMHG | RESPIRATION RATE: 28 BRPM | HEART RATE: 88 BPM | DIASTOLIC BLOOD PRESSURE: 58 MMHG

## 2021-05-26 VITALS
DIASTOLIC BLOOD PRESSURE: 50 MMHG | RESPIRATION RATE: 20 BRPM | OXYGEN SATURATION: 87 % | SYSTOLIC BLOOD PRESSURE: 80 MMHG | HEART RATE: 104 BPM

## 2021-05-26 VITALS — HEART RATE: 92 BPM | RESPIRATION RATE: 20 BRPM

## 2021-05-26 VITALS
SYSTOLIC BLOOD PRESSURE: 96 MMHG | RESPIRATION RATE: 16 BRPM | HEART RATE: 106 BPM | OXYGEN SATURATION: 93 % | DIASTOLIC BLOOD PRESSURE: 70 MMHG

## 2021-06-03 VITALS
OXYGEN SATURATION: 98 % | WEIGHT: 150.6 LBS | BODY MASS INDEX: 19.87 KG/M2 | SYSTOLIC BLOOD PRESSURE: 100 MMHG | DIASTOLIC BLOOD PRESSURE: 62 MMHG | RESPIRATION RATE: 16 BRPM | HEART RATE: 100 BPM